# Patient Record
Sex: MALE | Race: OTHER | Employment: UNEMPLOYED | URBAN - METROPOLITAN AREA
[De-identification: names, ages, dates, MRNs, and addresses within clinical notes are randomized per-mention and may not be internally consistent; named-entity substitution may affect disease eponyms.]

---

## 2017-09-14 ENCOUNTER — OFFICE VISIT (OUTPATIENT)
Dept: INTERNAL MEDICINE | Age: 27
End: 2017-09-14

## 2017-09-14 DIAGNOSIS — J45.20 MILD INTERMITTENT ASTHMA WITHOUT COMPLICATION: ICD-10-CM

## 2017-09-14 DIAGNOSIS — F89 DEVELOPMENTAL DISABILITY: ICD-10-CM

## 2017-09-14 DIAGNOSIS — F95.2 TOURETTE'S DISEASE: ICD-10-CM

## 2017-09-14 DIAGNOSIS — F42.9 OBSESSIVE-COMPULSIVE DISORDER, UNSPECIFIED TYPE: Primary | ICD-10-CM

## 2017-09-14 PROCEDURE — 99213 OFFICE O/P EST LOW 20 MIN: CPT | Performed by: INTERNAL MEDICINE

## 2017-09-26 ASSESSMENT — ENCOUNTER SYMPTOMS
NAUSEA: 0
VOMITING: 0
ABDOMINAL PAIN: 0
BACK PAIN: 0
SHORTNESS OF BREATH: 0

## 2017-10-13 ENCOUNTER — OFFICE VISIT (OUTPATIENT)
Dept: PSYCHOLOGY | Age: 27
End: 2017-10-13

## 2017-10-13 DIAGNOSIS — F42.2 MIXED OBSESSIONAL THOUGHTS AND ACTS: Primary | ICD-10-CM

## 2017-10-13 PROCEDURE — 90791 PSYCH DIAGNOSTIC EVALUATION: CPT | Performed by: PSYCHOLOGIST

## 2017-10-13 ASSESSMENT — PATIENT HEALTH QUESTIONNAIRE - PHQ9
1. LITTLE INTEREST OR PLEASURE IN DOING THINGS: 0
2. FEELING DOWN, DEPRESSED OR HOPELESS: 0
5. POOR APPETITE OR OVEREATING: 1
7. TROUBLE CONCENTRATING ON THINGS, SUCH AS READING THE NEWSPAPER OR WATCHING TELEVISION: 1
SUM OF ALL RESPONSES TO PHQ QUESTIONS 1-9: 7
3. TROUBLE FALLING OR STAYING ASLEEP: 0
4. FEELING TIRED OR HAVING LITTLE ENERGY: 3
6. FEELING BAD ABOUT YOURSELF - OR THAT YOU ARE A FAILURE OR HAVE LET YOURSELF OR YOUR FAMILY DOWN: 1
8. MOVING OR SPEAKING SO SLOWLY THAT OTHER PEOPLE COULD HAVE NOTICED. OR THE OPPOSITE, BEING SO FIGETY OR RESTLESS THAT YOU HAVE BEEN MOVING AROUND A LOT MORE THAN USUAL: 1
SUM OF ALL RESPONSES TO PHQ9 QUESTIONS 1 & 2: 0
10. IF YOU CHECKED OFF ANY PROBLEMS, HOW DIFFICULT HAVE THESE PROBLEMS MADE IT FOR YOU TO DO YOUR WORK, TAKE CARE OF THINGS AT HOME, OR GET ALONG WITH OTHER PEOPLE: 1
9. THOUGHTS THAT YOU WOULD BE BETTER OFF DEAD, OR OF HURTING YOURSELF: 0

## 2017-10-13 NOTE — PROGRESS NOTES
Behavioral Health Consultation  [address] PsyD  Psychologist  10/13/2017  8:35 AM      Time spent with Patient: 30 minutes  [facility]. Reason for Consult:  Tourette's, OCD, panic disorder  Referring Provider: Cris Iniguez MD  [address], [address]    Pt provided informed consent for the behavioral health program. Discussed with patient model of service to include the limits of confidentiality (i.e. abuse reporting, suicide intervention, etc.) and short-term intervention focused approach. Pt indicated understanding. Feedback given to PCP. S:  Pt was seen with mother, Carmelo Washburn. Pt shared IEP for history. Was evlauated in  at [facility]. Was dx with Tourettes, OCD, and ADD. Also saw neurologist there. Didn't graduate HS until age 22 due to problems with focus/concentration. GPA was less than 1.5. Has not applied for SSDI. Has not had treatment for these conditions. Was on Wellbutrin for one week. No therapy for these either. Stopped going to [facility] in 2009. Currently struggles with more vocal tics. OCD - touches things twice. Rituals: left foot first when walking, go in and out same door. Intrusive thoughts - has to re-start thought if has intrusive thought to decouple it with other action. Has dreams to write screenplays or do some work in film. Schooling and finances are barriers to this.     O:  MSE:    Appearance    alert, cooperative  Sleep disturbance No  Fatigue Yes  Loss of pleasure No  Impulsive behavior No  Speech    halted  Mood    \"ok\"  Affect   flat  Thought Content    intact  Thought Process    linear and goal directed  Associations    logical connections  Insight    Poor to fair  Judgment    Intact  Orientation    oriented to person, place, time, and general circumstances  Memory    recent and remote memory intact  Attention/Concentration    intact  Ability to understand instructions Yes  Ability to respond meaningfully Yes  Suicide Assessment    Denies SI      History:    Medications:   Current Outpatient Prescriptions   Medication Sig Dispense Refill    meclizine (ANTIVERT) 12.5 MG tablet Take one tab q 6 hrs prn severe dizziness 30 tablet 1     No current facility-administered medications for this visit. Social History:   Social History     Social History    Marital status: Single     Spouse name: N/A    Number of children: N/A    Years of education: N/A     Occupational History    Not on file. Social History Main Topics    Smoking status: Never Smoker    Smokeless tobacco: Never Used    Alcohol use No    Drug use: No    Sexual activity: Not on file     Other Topics Concern    Not on file     Social History Narrative    Past Medical History                                                      Past Surgical History      wisdom teeth extraction - February 26, 2009 - 9:37 AM, canine tooth extracted - July 14, 2009                                                              Last updated by Janki Denny MA on 02/12/2010 9:54:01 AM             Social History     Marital Status: single,   Children: None,   Employment Status: student,   Alcohol Use: none    Drug Use: none    Tobacco Usage:non-smoker                                                        Last updated by Janki Denny MA on 02/12/2010 9:54:01 AM             Family History     mother - Brigitte Montero -     father - Ari Sheriff - schizophrenia (diagnosed age 12)        half-sister - Oliver Arriaga -    half-sister - Silvia Love -                                          Last updated by Janki Denny MA on 02/12/2010 9:54:01 AM                           TOBACCO:   reports that he has never smoked. He has never used smokeless tobacco.  ETOH:   reports that he does not drink alcohol. Family History:   No family history on file.       A:  Mr. Mino Gutierrez reports diagnoses of OCD, Tourettes, and ADD when in [facility] for which he received some services through [address]. He reports no behavioral therapy throughout his life. He has also not been medicated for these conditions. Mr. Lavern Bush is interested in treatment to improve quality of life. Due to lack of re-assessment since , it is recommended pt be thoroughly assessed through psychological testing to best determine appropriate treatment plan. He is agreeable.       Diagnosis:    Per history:  OCD  Tourette's  ADD      Plan:  Pt interventions:  Established rapport, agenda setting for visit, clinical interview, treatment planning    Pt Behavioral Change Plan:  Pt set goals to 1) call [facility] for psychological evaluation for diagnostic clarification 2) return for f/u after evaluation is completed

## 2017-12-13 ENCOUNTER — TELEPHONE (OUTPATIENT)
Dept: INTERNAL MEDICINE | Age: 27
End: 2017-12-13

## 2017-12-13 NOTE — TELEPHONE ENCOUNTER
I faxed the requested records mentioned below to [employer] [facility] today. I will scan the release, the notes that I faxed and the fax success into media and attach it to this phone note. I will close this phone note.

## 2017-12-13 NOTE — TELEPHONE ENCOUNTER
[employer] [facility] [place] [employer] division of disability determination sent request via fax Case # [id number]. Records requested from Dr. Lluvia Conner. Request dates from 6/2016- present. Request contains questionnaire as well. Handed to Medical records.

## 2017-12-18 ENCOUNTER — TELEPHONE (OUTPATIENT)
Dept: INTERNAL MEDICINE | Age: 27
End: 2017-12-18

## 2017-12-18 NOTE — TELEPHONE ENCOUNTER
We received a request for records from [place] [address] [facility] dated 12-. Case # [id number]. The request is for records from 9-2016 to present.

## 2019-01-03 ENCOUNTER — OFFICE VISIT (OUTPATIENT)
Dept: INTERNAL MEDICINE CLINIC | Age: 29
End: 2019-01-03
Payer: MEDICAID

## 2019-01-03 VITALS
HEIGHT: 66 IN | DIASTOLIC BLOOD PRESSURE: 64 MMHG | WEIGHT: 103 LBS | SYSTOLIC BLOOD PRESSURE: 122 MMHG | BODY MASS INDEX: 16.55 KG/M2

## 2019-01-03 DIAGNOSIS — F42.2 MIXED OBSESSIONAL THOUGHTS AND ACTS: ICD-10-CM

## 2019-01-03 DIAGNOSIS — E55.9 VITAMIN D DEFICIENCY: ICD-10-CM

## 2019-01-03 DIAGNOSIS — R42 DIZZINESS: Primary | ICD-10-CM

## 2019-01-03 DIAGNOSIS — R00.2 PALPITATIONS: ICD-10-CM

## 2019-01-03 DIAGNOSIS — R53.82 CHRONIC FATIGUE: ICD-10-CM

## 2019-01-03 DIAGNOSIS — Z11.4 ENCOUNTER FOR SCREENING FOR HIV: ICD-10-CM

## 2019-01-03 DIAGNOSIS — F89 DEVELOPMENTAL DISABILITY: ICD-10-CM

## 2019-01-03 DIAGNOSIS — R63.6 UNDERWEIGHT: ICD-10-CM

## 2019-01-03 PROCEDURE — G8484 FLU IMMUNIZE NO ADMIN: HCPCS | Performed by: INTERNAL MEDICINE

## 2019-01-03 PROCEDURE — G8419 CALC BMI OUT NRM PARAM NOF/U: HCPCS | Performed by: INTERNAL MEDICINE

## 2019-01-03 PROCEDURE — 99204 OFFICE O/P NEW MOD 45 MIN: CPT | Performed by: INTERNAL MEDICINE

## 2019-01-03 PROCEDURE — 1036F TOBACCO NON-USER: CPT | Performed by: INTERNAL MEDICINE

## 2019-01-03 PROCEDURE — G8427 DOCREV CUR MEDS BY ELIG CLIN: HCPCS | Performed by: INTERNAL MEDICINE

## 2019-01-03 ASSESSMENT — PATIENT HEALTH QUESTIONNAIRE - PHQ9
SUM OF ALL RESPONSES TO PHQ QUESTIONS 1-9: 0
1. LITTLE INTEREST OR PLEASURE IN DOING THINGS: 0
2. FEELING DOWN, DEPRESSED OR HOPELESS: 0
SUM OF ALL RESPONSES TO PHQ QUESTIONS 1-9: 0
SUM OF ALL RESPONSES TO PHQ9 QUESTIONS 1 & 2: 0

## 2019-01-03 ASSESSMENT — ENCOUNTER SYMPTOMS
STRIDOR: 0
SHORTNESS OF BREATH: 0
CHEST TIGHTNESS: 0
RESPIRATORY NEGATIVE: 1
CHOKING: 0
ABDOMINAL PAIN: 0
COUGH: 0

## 2019-03-12 ENCOUNTER — TELEPHONE (OUTPATIENT)
Dept: INTERNAL MEDICINE CLINIC | Age: 29
End: 2019-03-12

## 2019-03-14 ENCOUNTER — OFFICE VISIT (OUTPATIENT)
Dept: INTERNAL MEDICINE CLINIC | Age: 29
End: 2019-03-14
Payer: MEDICAID

## 2019-03-14 VITALS
BODY MASS INDEX: 16.88 KG/M2 | HEIGHT: 66 IN | SYSTOLIC BLOOD PRESSURE: 92 MMHG | DIASTOLIC BLOOD PRESSURE: 62 MMHG | WEIGHT: 105 LBS

## 2019-03-14 DIAGNOSIS — A64 STD (MALE): Primary | ICD-10-CM

## 2019-03-14 DIAGNOSIS — F41.9 ANXIETY: ICD-10-CM

## 2019-03-14 DIAGNOSIS — Z20.2 STD EXPOSURE: ICD-10-CM

## 2019-03-14 DIAGNOSIS — R63.6 UNDERWEIGHT: ICD-10-CM

## 2019-03-14 DIAGNOSIS — N39.0 URINARY TRACT INFECTION WITHOUT HEMATURIA, SITE UNSPECIFIED: ICD-10-CM

## 2019-03-14 DIAGNOSIS — F42.2 MIXED OBSESSIONAL THOUGHTS AND ACTS: ICD-10-CM

## 2019-03-14 DIAGNOSIS — N30.01 ACUTE CYSTITIS WITH HEMATURIA: ICD-10-CM

## 2019-03-14 LAB
BILIRUBIN, POC: 0
BLOOD URINE, POC: NORMAL
CLARITY, POC: NORMAL
COLOR, POC: NORMAL
GLUCOSE URINE, POC: 0
KETONES, POC: 0
LEUKOCYTE EST, POC: NORMAL
NITRITE, POC: 0
PH, POC: 7
PROTEIN, POC: NORMAL
SPECIFIC GRAVITY, POC: 1
UROBILINOGEN, POC: 0

## 2019-03-14 PROCEDURE — 81002 URINALYSIS NONAUTO W/O SCOPE: CPT | Performed by: INTERNAL MEDICINE

## 2019-03-14 PROCEDURE — G8427 DOCREV CUR MEDS BY ELIG CLIN: HCPCS | Performed by: INTERNAL MEDICINE

## 2019-03-14 PROCEDURE — G8484 FLU IMMUNIZE NO ADMIN: HCPCS | Performed by: INTERNAL MEDICINE

## 2019-03-14 PROCEDURE — 99214 OFFICE O/P EST MOD 30 MIN: CPT | Performed by: INTERNAL MEDICINE

## 2019-03-14 PROCEDURE — 1036F TOBACCO NON-USER: CPT | Performed by: INTERNAL MEDICINE

## 2019-03-14 PROCEDURE — G8419 CALC BMI OUT NRM PARAM NOF/U: HCPCS | Performed by: INTERNAL MEDICINE

## 2019-03-14 RX ORDER — CIPROFLOXACIN 250 MG/1
250 TABLET, FILM COATED ORAL 2 TIMES DAILY
Qty: 28 TABLET | Refills: 0 | Status: SHIPPED | OUTPATIENT
Start: 2019-03-14 | End: 2019-03-20 | Stop reason: SINTOL

## 2019-03-14 ASSESSMENT — PATIENT HEALTH QUESTIONNAIRE - PHQ9
1. LITTLE INTEREST OR PLEASURE IN DOING THINGS: 1
SUM OF ALL RESPONSES TO PHQ QUESTIONS 1-9: 2
2. FEELING DOWN, DEPRESSED OR HOPELESS: 1
SUM OF ALL RESPONSES TO PHQ9 QUESTIONS 1 & 2: 2
SUM OF ALL RESPONSES TO PHQ QUESTIONS 1-9: 2

## 2019-03-14 ASSESSMENT — ENCOUNTER SYMPTOMS
SHORTNESS OF BREATH: 0
RESPIRATORY NEGATIVE: 1
CHEST TIGHTNESS: 0
ABDOMINAL PAIN: 0

## 2019-03-15 ENCOUNTER — TELEPHONE (OUTPATIENT)
Dept: INTERNAL MEDICINE CLINIC | Age: 29
End: 2019-03-15

## 2019-03-15 LAB
C. TRACHOMATIS DNA ,URINE: NEGATIVE
N. GONORRHOEAE DNA, URINE: NEGATIVE

## 2019-03-17 LAB
ORGANISM: ABNORMAL
URINE CULTURE, ROUTINE: ABNORMAL
URINE CULTURE, ROUTINE: ABNORMAL

## 2019-03-20 ENCOUNTER — TELEPHONE (OUTPATIENT)
Dept: INTERNAL MEDICINE CLINIC | Age: 29
End: 2019-03-20

## 2019-03-20 RX ORDER — AMOXICILLIN 500 MG/1
1 CAPSULE ORAL 3 TIMES DAILY
Refills: 0 | COMMUNITY
Start: 2019-03-16 | End: 2019-05-02 | Stop reason: CLARIF

## 2019-03-22 ENCOUNTER — OFFICE VISIT (OUTPATIENT)
Dept: INTERNAL MEDICINE CLINIC | Age: 29
End: 2019-03-22
Payer: MEDICAID

## 2019-03-22 VITALS
HEIGHT: 66 IN | WEIGHT: 108 LBS | DIASTOLIC BLOOD PRESSURE: 64 MMHG | SYSTOLIC BLOOD PRESSURE: 102 MMHG | BODY MASS INDEX: 17.36 KG/M2

## 2019-03-22 DIAGNOSIS — N30.01 ACUTE CYSTITIS WITH HEMATURIA: ICD-10-CM

## 2019-03-22 DIAGNOSIS — R63.6 UNDERWEIGHT: ICD-10-CM

## 2019-03-22 DIAGNOSIS — F41.9 ANXIETY: ICD-10-CM

## 2019-03-22 DIAGNOSIS — F42.2 MIXED OBSESSIONAL THOUGHTS AND ACTS: ICD-10-CM

## 2019-03-22 LAB
BILIRUBIN URINE: NEGATIVE
BLOOD, URINE: ABNORMAL
CLARITY: CLEAR
COLOR: YELLOW
GLUCOSE URINE: NEGATIVE MG/DL
KETONES, URINE: NEGATIVE MG/DL
LEUKOCYTE ESTERASE, URINE: NEGATIVE
MICROSCOPIC EXAMINATION: YES
NITRITE, URINE: NEGATIVE
PH UA: 6 (ref 5–8)
PROTEIN UA: NEGATIVE MG/DL
SPECIFIC GRAVITY UA: 1.02 (ref 1–1.03)
URINE TYPE: ABNORMAL
UROBILINOGEN, URINE: 0.2 E.U./DL

## 2019-03-22 PROCEDURE — G8419 CALC BMI OUT NRM PARAM NOF/U: HCPCS | Performed by: INTERNAL MEDICINE

## 2019-03-22 PROCEDURE — 99214 OFFICE O/P EST MOD 30 MIN: CPT | Performed by: INTERNAL MEDICINE

## 2019-03-22 PROCEDURE — 1036F TOBACCO NON-USER: CPT | Performed by: INTERNAL MEDICINE

## 2019-03-22 PROCEDURE — 81003 URINALYSIS AUTO W/O SCOPE: CPT | Performed by: INTERNAL MEDICINE

## 2019-03-22 PROCEDURE — G8427 DOCREV CUR MEDS BY ELIG CLIN: HCPCS | Performed by: INTERNAL MEDICINE

## 2019-03-22 PROCEDURE — G8484 FLU IMMUNIZE NO ADMIN: HCPCS | Performed by: INTERNAL MEDICINE

## 2019-03-22 ASSESSMENT — ENCOUNTER SYMPTOMS
SHORTNESS OF BREATH: 0
NAUSEA: 0
VOMITING: 0
ABDOMINAL PAIN: 0

## 2019-03-23 LAB
EPITHELIAL CELLS, UA: 0 /HPF (ref 0–5)
HYALINE CASTS: 0 /LPF (ref 0–8)
RBC UA: 1 /HPF (ref 0–4)
WBC UA: 1 /HPF (ref 0–5)

## 2019-03-24 LAB — URINE CULTURE, ROUTINE: NORMAL

## 2019-03-25 ENCOUNTER — TELEPHONE (OUTPATIENT)
Dept: INTERNAL MEDICINE CLINIC | Age: 29
End: 2019-03-25

## 2019-03-25 DIAGNOSIS — Z00.00 ROUTINE GENERAL MEDICAL EXAMINATION AT A HEALTH CARE FACILITY: Primary | ICD-10-CM

## 2019-03-25 DIAGNOSIS — R53.82 CHRONIC FATIGUE: ICD-10-CM

## 2019-03-25 DIAGNOSIS — E55.9 VITAMIN D DEFICIENCY: ICD-10-CM

## 2019-03-25 DIAGNOSIS — Z00.00 ROUTINE GENERAL MEDICAL EXAMINATION AT A HEALTH CARE FACILITY: ICD-10-CM

## 2019-03-25 DIAGNOSIS — R42 DIZZINESS: ICD-10-CM

## 2019-03-25 DIAGNOSIS — R00.2 PALPITATIONS: ICD-10-CM

## 2019-03-25 DIAGNOSIS — Z11.4 ENCOUNTER FOR SCREENING FOR HIV: ICD-10-CM

## 2019-03-25 LAB
BASOPHILS ABSOLUTE: 0 K/UL (ref 0–0.2)
BASOPHILS RELATIVE PERCENT: 0.7 %
EOSINOPHILS ABSOLUTE: 0 K/UL (ref 0–0.6)
EOSINOPHILS RELATIVE PERCENT: 0.6 %
HCT VFR BLD CALC: 45.8 % (ref 40.5–52.5)
HEMOGLOBIN: 15.6 G/DL (ref 13.5–17.5)
LYMPHOCYTES ABSOLUTE: 1.3 K/UL (ref 1–5.1)
LYMPHOCYTES RELATIVE PERCENT: 26.9 %
MCH RBC QN AUTO: 31 PG (ref 26–34)
MCHC RBC AUTO-ENTMCNC: 34.1 G/DL (ref 31–36)
MCV RBC AUTO: 90.7 FL (ref 80–100)
MONOCYTES ABSOLUTE: 0.4 K/UL (ref 0–1.3)
MONOCYTES RELATIVE PERCENT: 8.4 %
NEUTROPHILS ABSOLUTE: 3 K/UL (ref 1.7–7.7)
NEUTROPHILS RELATIVE PERCENT: 63.4 %
PDW BLD-RTO: 13.5 % (ref 12.4–15.4)
PLATELET # BLD: 304 K/UL (ref 135–450)
PMV BLD AUTO: 8.6 FL (ref 5–10.5)
RBC # BLD: 5.05 M/UL (ref 4.2–5.9)
WBC # BLD: 4.8 K/UL (ref 4–11)

## 2019-03-26 LAB
A/G RATIO: 1.6 (ref 1.1–2.2)
ALBUMIN SERPL-MCNC: 4.5 G/DL (ref 3.4–5)
ALP BLD-CCNC: 83 U/L (ref 40–129)
ALT SERPL-CCNC: 12 U/L (ref 10–40)
ANION GAP SERPL CALCULATED.3IONS-SCNC: 14 MMOL/L (ref 3–16)
AST SERPL-CCNC: 14 U/L (ref 15–37)
BILIRUB SERPL-MCNC: 0.5 MG/DL (ref 0–1)
BUN BLDV-MCNC: 7 MG/DL (ref 7–20)
CALCIUM SERPL-MCNC: 10 MG/DL (ref 8.3–10.6)
CHLORIDE BLD-SCNC: 103 MMOL/L (ref 99–110)
CHOLESTEROL, TOTAL: 157 MG/DL (ref 0–199)
CO2: 26 MMOL/L (ref 21–32)
CREAT SERPL-MCNC: 0.7 MG/DL (ref 0.9–1.3)
FOLATE: 14.97 NG/ML (ref 4.78–24.2)
GFR AFRICAN AMERICAN: >60
GFR NON-AFRICAN AMERICAN: >60
GLOBULIN: 2.8 G/DL
GLUCOSE BLD-MCNC: 89 MG/DL (ref 70–99)
HDLC SERPL-MCNC: 49 MG/DL (ref 40–60)
HIV AG/AB: NORMAL
HIV ANTIGEN: NORMAL
HIV-1 ANTIBODY: NORMAL
HIV-2 AB: NORMAL
LDL CHOLESTEROL CALCULATED: 101 MG/DL
MAGNESIUM: 2.2 MG/DL (ref 1.8–2.4)
POTASSIUM SERPL-SCNC: 4.2 MMOL/L (ref 3.5–5.1)
SODIUM BLD-SCNC: 143 MMOL/L (ref 136–145)
T4 FREE: 1.4 NG/DL (ref 0.9–1.8)
TOTAL PROTEIN: 7.3 G/DL (ref 6.4–8.2)
TRIGL SERPL-MCNC: 36 MG/DL (ref 0–150)
TSH SERPL DL<=0.05 MIU/L-ACNC: 0.81 UIU/ML (ref 0.27–4.2)
VITAMIN B-12: 369 PG/ML (ref 211–911)
VITAMIN D 25-HYDROXY: <5 NG/ML
VLDLC SERPL CALC-MCNC: 7 MG/DL

## 2019-03-26 RX ORDER — ERGOCALCIFEROL 1.25 MG/1
50000 CAPSULE ORAL WEEKLY
Qty: 12 CAPSULE | Refills: 0 | Status: SHIPPED | OUTPATIENT
Start: 2019-03-26 | End: 2019-06-06 | Stop reason: SDUPTHER

## 2019-03-29 ENCOUNTER — TELEPHONE (OUTPATIENT)
Dept: INTERNAL MEDICINE CLINIC | Age: 29
End: 2019-03-29

## 2019-03-29 DIAGNOSIS — M25.50 ARTHRALGIA, UNSPECIFIED JOINT: Primary | ICD-10-CM

## 2019-04-02 ENCOUNTER — OFFICE VISIT (OUTPATIENT)
Dept: ORTHOPEDIC SURGERY | Age: 29
End: 2019-04-02
Payer: COMMERCIAL

## 2019-04-02 VITALS
SYSTOLIC BLOOD PRESSURE: 110 MMHG | BODY MASS INDEX: 17.36 KG/M2 | DIASTOLIC BLOOD PRESSURE: 72 MMHG | WEIGHT: 108 LBS | HEIGHT: 66 IN | HEART RATE: 94 BPM

## 2019-04-02 DIAGNOSIS — M25.561 ACUTE PAIN OF BOTH KNEES: Primary | ICD-10-CM

## 2019-04-02 DIAGNOSIS — M25.562 ACUTE PAIN OF BOTH KNEES: Primary | ICD-10-CM

## 2019-04-02 PROCEDURE — 99204 OFFICE O/P NEW MOD 45 MIN: CPT | Performed by: PHYSICIAN ASSISTANT

## 2019-04-02 NOTE — PROGRESS NOTES
Patient Name: Kisha Manning  : 1990  DOS: 2019        Chief Complaint:   Chief Complaint   Patient presents with    Knee Pain     Bilateral knee       History of Present Illness:  Kisha Manning is a 33 y.o. male who presents with a chief complaint of continued complaints of pain in the knee with irritation with walking, stairs and with overuse. Pain in the knee regularly with swelling and discomfort. Increase in pain over the past several weeks time. Patient notes his overall pain started on March 15 when he was given a prescription for ciprofloxacin for treatment of a UTI. Patient notes he took one dose of medication and 2-4 hours later developed significant tendon tightness and pain as well as joint pains and muscle cramps. Specifically isolated to bilateral Achilles tendons and bilateral knees. Notes that immediately stop taking the medication and got treated with a different antibiotic no residual effects from that medication. He notes since then he has had occasional muscle cramping and pain still residing in the anterior medial portion of the knees. States that a lot of the pain is noted when he goes up and down stairs as well as up and down hills. He notes that he has been using some CoQ 10 as well as magnesium to help with muscle aches and cramps and he notes he has noticed some mild improvement in his overall pains. He rates his current pain level VI out of 10 and states it is intermittent in nature. He notes he is very concerned that the medication may cause permanent muscle and joint damage. Medical History  Current Medications:   Prior to Admission medications    Medication Sig Start Date End Date Taking?  Authorizing Provider   Homeopathic Products (SPEEDGEL) GEL Formula #8E Baclo 2%, Diclo 3%, DMSO 5%, Ifeanyi 6%, Lido 2%, prilo 2% Pharm to comp 19  Yes KEI James   vitamin D (ERGOCALCIFEROL) 50080 units CAPS capsule Take 1 capsule by mouth once a week 4/5     Palpation: There is minimal tenderness along the medial joint line. Special Tests: Patellar Compression test is positive. Valgus & Varus test is negative. Skin: There are no rashes, ulcerations or lesions. Gait: Gait pattern is antalgic  Skin shows no rashes/ecchymosis to the affected area, no hyperesthesias, no discoloration, no temperature or color discrepancies. NEUROLOGICALLY: There is no evidence for sensory or motor deficits in the extremity. Coordination appears full with no spacticity or rigidity. Reflexes appear to be symmetric. Distal circulation intact. No signs of RSD. Additional Comments:  No deficiencies to quadriceps as well as moderate tightness noted to bilateral hamstrings. Concerns for development of patellofemoral syndrome and overall muscle decompensation may be contributing to some of the patient's pain. Procedure(s): No new procedure performed today    Diagnostic Test Findings:   Xray   no new x-ray preformed today    Assessment and Plan:       Diagnosis Orders   1.  Acute pain of both knees  Ambulatory referral to Physical Therapy              The orders below, if any, were placed during this visit:   Orders Placed This Encounter   Procedures    Ambulatory referral to Physical Therapy     Referral Priority:   Routine     Referral Type:   Eval and Treat     Requested Specialty:   Physical Therapy     Number of Visits Requested:   1              Treatment Plan:   -Acute onset of bilateral knee pain discussed with patient the prognosis of this is very positive and should resolve with minimal treatment.  -Advisor patient to begin physical therapy regimen for quadriceps strengthening as well as hamstrings stretching and strengthening as well to help avoid further patellar femoral disorders from developing in the future  -advised use of over-the-counter supplementation to help with anti-inflammatory purposes including tart cherry juice and tumeric   -also gave

## 2019-04-05 ENCOUNTER — OFFICE VISIT (OUTPATIENT)
Dept: INTERNAL MEDICINE CLINIC | Age: 29
End: 2019-04-05
Payer: COMMERCIAL

## 2019-04-05 VITALS
BODY MASS INDEX: 17.68 KG/M2 | HEIGHT: 66 IN | WEIGHT: 110 LBS | SYSTOLIC BLOOD PRESSURE: 110 MMHG | DIASTOLIC BLOOD PRESSURE: 68 MMHG

## 2019-04-05 DIAGNOSIS — E55.9 VITAMIN D DEFICIENCY: ICD-10-CM

## 2019-04-05 DIAGNOSIS — F41.9 ANXIETY: ICD-10-CM

## 2019-04-05 DIAGNOSIS — R63.6 UNDERWEIGHT: ICD-10-CM

## 2019-04-05 DIAGNOSIS — F42.2 MIXED OBSESSIONAL THOUGHTS AND ACTS: ICD-10-CM

## 2019-04-05 PROBLEM — N39.0 UTI (URINARY TRACT INFECTION): Status: RESOLVED | Noted: 2019-03-14 | Resolved: 2019-04-05

## 2019-04-05 PROCEDURE — G0444 DEPRESSION SCREEN ANNUAL: HCPCS | Performed by: INTERNAL MEDICINE

## 2019-04-05 PROCEDURE — 99214 OFFICE O/P EST MOD 30 MIN: CPT | Performed by: INTERNAL MEDICINE

## 2019-04-05 ASSESSMENT — PATIENT HEALTH QUESTIONNAIRE - PHQ9
SUM OF ALL RESPONSES TO PHQ9 QUESTIONS 1 & 2: 2
SUM OF ALL RESPONSES TO PHQ QUESTIONS 1-9: 2
SUM OF ALL RESPONSES TO PHQ QUESTIONS 1-9: 2
2. FEELING DOWN, DEPRESSED OR HOPELESS: 1
1. LITTLE INTEREST OR PLEASURE IN DOING THINGS: 1

## 2019-04-05 ASSESSMENT — ENCOUNTER SYMPTOMS
CHEST TIGHTNESS: 0
BACK PAIN: 0
VOMITING: 0
RESPIRATORY NEGATIVE: 1
SHORTNESS OF BREATH: 0
ABDOMINAL PAIN: 0
NAUSEA: 0

## 2019-04-05 NOTE — PROGRESS NOTES
Subjective:      Patient ID: Kisha Manning is a 33 y.o. male. Chief Complaint   Patient presents with    URI     follow up from 3/22/19??? No more UTI symptoms. No reaction to antibiotics. Says he has been \"taking supplements\" - magnesium, co enzyme q10, multivitamin. Also eating more fruits and vegetables. Has been doing exercise machine at home. Says he is waking up early - about 6am, but goes to sleep about 8pm. Stll struggling w OCD but not getting therapy and has been getting very anxious especially about health related issues. Appetite has been better recently. Having occ palpitations but somewhat improved as of late. Taking vit d as rx'd      Review of Systems   Constitutional: Negative. Negative for activity change, appetite change and fatigue. HENT: Negative. Respiratory: Negative. Negative for chest tightness and shortness of breath. Cardiovascular: Negative for chest pain and palpitations. Gastrointestinal: Negative for abdominal pain, nausea and vomiting. Genitourinary: Negative. Musculoskeletal: Negative for back pain. Skin: Negative. Neurological: Negative for weakness and numbness. Psychiatric/Behavioral: Negative for agitation, dysphoric mood and sleep disturbance. The patient is nervous/anxious. Objective:   Physical Exam   Constitutional: He is oriented to person, place, and time. He appears well-developed and well-nourished. HENT:   Head: Normocephalic and atraumatic. Mouth/Throat: No oropharyngeal exudate. Eyes: Pupils are equal, round, and reactive to light. Conjunctivae and EOM are normal.   Neck: Normal range of motion. Neck supple. No thyromegaly present. Cardiovascular: Normal rate, regular rhythm and normal heart sounds. No murmur heard. Pulmonary/Chest: Effort normal and breath sounds normal. No respiratory distress. He has no wheezes. Abdominal: He exhibits no distension. There is no tenderness.    Musculoskeletal: He exhibits no edema.   Lymphadenopathy:     He has no cervical adenopathy. Neurological: He is alert and oriented to person, place, and time. He has normal reflexes. No cranial nerve deficit. Skin: Skin is warm and dry. Psychiatric:   Odd affect-at baseline         Assessment and Plan:      OCD (obsessive compulsive disorder)  Needs to see Dr. Cristian Bryson make appt    Anxiety  Needs to discuss w psych-may need external referral based on Dr. Levon Eastman suggestions    Underweight  Cont to encourage small frequent meals w high caloric content    Vitamin D deficiency  Discussed regimen-pt relates understanding-will clayton in 3-4 mos       Encounter Diagnoses   Name Primary?  Mixed obsessional thoughts and acts     Anxiety     Underweight     Vitamin D deficiency        No orders of the defined types were placed in this encounter.

## 2019-04-12 ENCOUNTER — OFFICE VISIT (OUTPATIENT)
Dept: PSYCHOLOGY | Age: 29
End: 2019-04-12
Payer: COMMERCIAL

## 2019-04-12 DIAGNOSIS — F95.2 TOURETTE'S DISEASE: ICD-10-CM

## 2019-04-12 DIAGNOSIS — F89 DEVELOPMENTAL DISABILITY: Primary | ICD-10-CM

## 2019-04-12 PROCEDURE — 90791 PSYCH DIAGNOSTIC EVALUATION: CPT | Performed by: PSYCHOLOGIST

## 2019-04-12 ASSESSMENT — PATIENT HEALTH QUESTIONNAIRE - PHQ9
1. LITTLE INTEREST OR PLEASURE IN DOING THINGS: 0
SUM OF ALL RESPONSES TO PHQ QUESTIONS 1-9: 0
SUM OF ALL RESPONSES TO PHQ QUESTIONS 1-9: 0
2. FEELING DOWN, DEPRESSED OR HOPELESS: 0
SUM OF ALL RESPONSES TO PHQ9 QUESTIONS 1 & 2: 0

## 2019-04-12 ASSESSMENT — ANXIETY QUESTIONNAIRES
7. FEELING AFRAID AS IF SOMETHING AWFUL MIGHT HAPPEN: 0-NOT AT ALL SURE
1. FEELING NERVOUS, ANXIOUS, OR ON EDGE: 0-NOT AT ALL SURE
GAD7 TOTAL SCORE: 2
2. NOT BEING ABLE TO STOP OR CONTROL WORRYING: 0-NOT AT ALL SURE
4. TROUBLE RELAXING: 0-NOT AT ALL SURE
5. BEING SO RESTLESS THAT IT IS HARD TO SIT STILL: 1-SEVERAL DAYS
6. BECOMING EASILY ANNOYED OR IRRITABLE: 0-NOT AT ALL SURE
3. WORRYING TOO MUCH ABOUT DIFFERENT THINGS: 1-SEVERAL DAYS

## 2019-04-12 NOTE — PROGRESS NOTES
capsule 0    amoxicillin (AMOXIL) 500 MG capsule Take 1 capsule by mouth 3 times daily  0    meclizine (ANTIVERT) 12.5 MG tablet Take one tab q 6 hrs prn severe dizziness 30 tablet 1     No current facility-administered medications for this visit.         Social History:   Social History     Socioeconomic History    Marital status: Single     Spouse name: Not on file    Number of children: Not on file    Years of education: Not on file    Highest education level: Not on file   Occupational History    Not on file   Social Needs    Financial resource strain: Not on file    Food insecurity:     Worry: Not on file     Inability: Not on file    Transportation needs:     Medical: Not on file     Non-medical: Not on file   Tobacco Use    Smoking status: Never Smoker    Smokeless tobacco: Never Used   Substance and Sexual Activity    Alcohol use: No    Drug use: No    Sexual activity: Not on file   Lifestyle    Physical activity:     Days per week: Not on file     Minutes per session: Not on file    Stress: Not on file   Relationships    Social connections:     Talks on phone: Not on file     Gets together: Not on file     Attends Religion service: Not on file     Active member of club or organization: Not on file     Attends meetings of clubs or organizations: Not on file     Relationship status: Not on file    Intimate partner violence:     Fear of current or ex partner: Not on file     Emotionally abused: Not on file     Physically abused: Not on file     Forced sexual activity: Not on file   Other Topics Concern    Not on file   Social History Narrative    Past Medical History                                                      Past Surgical History      wisdom teeth extraction - February 26, 2009 - 9:37 AM, canine tooth extracted - July 14, 2009                                                              Last updated by Timothy Naik MA on 02/12/2010 9:54:01 AM             Social History     Marital

## 2019-05-02 ENCOUNTER — OFFICE VISIT (OUTPATIENT)
Dept: INTERNAL MEDICINE CLINIC | Age: 29
End: 2019-05-02
Payer: COMMERCIAL

## 2019-05-02 VITALS
SYSTOLIC BLOOD PRESSURE: 108 MMHG | HEIGHT: 66 IN | DIASTOLIC BLOOD PRESSURE: 66 MMHG | WEIGHT: 110 LBS | BODY MASS INDEX: 17.68 KG/M2

## 2019-05-02 DIAGNOSIS — F89 DEVELOPMENTAL DISABILITY: ICD-10-CM

## 2019-05-02 DIAGNOSIS — M25.562 CHRONIC PAIN OF BOTH KNEES: ICD-10-CM

## 2019-05-02 DIAGNOSIS — F41.9 ANXIETY: ICD-10-CM

## 2019-05-02 DIAGNOSIS — E55.9 VITAMIN D DEFICIENCY: ICD-10-CM

## 2019-05-02 DIAGNOSIS — R63.6 UNDERWEIGHT: ICD-10-CM

## 2019-05-02 DIAGNOSIS — M25.561 CHRONIC PAIN OF BOTH KNEES: ICD-10-CM

## 2019-05-02 DIAGNOSIS — G89.29 CHRONIC PAIN OF BOTH KNEES: ICD-10-CM

## 2019-05-02 DIAGNOSIS — F42.2 MIXED OBSESSIONAL THOUGHTS AND ACTS: ICD-10-CM

## 2019-05-02 PROBLEM — R00.2 PALPITATIONS: Status: RESOLVED | Noted: 2019-01-03 | Resolved: 2019-05-02

## 2019-05-02 PROCEDURE — 99395 PREV VISIT EST AGE 18-39: CPT | Performed by: INTERNAL MEDICINE

## 2019-05-02 ASSESSMENT — ENCOUNTER SYMPTOMS
ABDOMINAL PAIN: 0
SHORTNESS OF BREATH: 0
CHEST TIGHTNESS: 0
RESPIRATORY NEGATIVE: 1

## 2019-05-02 NOTE — ASSESSMENT & PLAN NOTE
See orders for patient and pt also given complete instructions re: course of therapy  Likely more muscular -needs to do more stretching

## 2019-05-02 NOTE — PROGRESS NOTES
Subjective:      Patient ID: Sujata Price is a 33 y.o. male. Chief Complaint   Patient presents with    Annual Exam     yearly/ tdap needed? Still having some muscle and joint pain-mostly in knees has not called yet to make appointment [facility] for his mental disability and lack of concentration. Taking his vit d regularly. Sujata Price  1990    Allergies   Allergen Reactions    Ciprofloxacin      Joint pain        Current Outpatient Medications   Medication Sig Dispense Refill    Homeopathic Products (SPEEDGEL) GEL Formula #8E Baclo 2%, Diclo 3%, DMSO 5%, Ifeanyi 6%, Lido 2%, prilo 2% Pharm to comp 2 Tube 11    vitamin D (ERGOCALCIFEROL) 04098 units CAPS capsule Take 1 capsule by mouth once a week 12 capsule 0    meclizine (ANTIVERT) 12.5 MG tablet Take one tab q 6 hrs prn severe dizziness 30 tablet 1     No current facility-administered medications for this visit. Vitals:    05/02/19 1344   BP: 108/66   Weight: 110 lb (49.9 kg)   Height: 5' 6\" (1.676 m)     Body mass index is 17.75 kg/m². Wt Readings from Last 3 Encounters:   05/02/19 110 lb (49.9 kg)   04/05/19 110 lb (49.9 kg)   04/02/19 108 lb (49 kg)     BP Readings from Last 3 Encounters:   05/02/19 108/66   04/05/19 110/68   04/02/19 110/72       Immunization History   Administered Date(s) Administered    Meningococcal MPSV4 (Menomune) 02/12/2010    Tdap (Boostrix, Adacel) 02/12/2010       Past Medical History:   Diagnosis Date    Asthma     Tourette's disease      Past Surgical History:   Procedure Laterality Date    WISDOM TOOTH EXTRACTION  2/26/2009     No family history on file.   Social History     Socioeconomic History    Marital status: Single     Spouse name: Not on file    Number of children: Not on file    Years of education: Not on file    Highest education level: Not on file   Occupational History    Not on file   Social Needs    Financial resource strain: Not on file    Food insecurity: Worry: Not on file     Inability: Not on file    Transportation needs:     Medical: Not on file     Non-medical: Not on file   Tobacco Use    Smoking status: Never Smoker    Smokeless tobacco: Never Used   Substance and Sexual Activity    Alcohol use: No    Drug use: No    Sexual activity: Not on file   Lifestyle    Physical activity:     Days per week: Not on file     Minutes per session: Not on file    Stress: Not on file   Relationships    Social connections:     Talks on phone: Not on file     Gets together: Not on file     Attends Moravian service: Not on file     Active member of club or organization: Not on file     Attends meetings of clubs or organizations: Not on file     Relationship status: Not on file    Intimate partner violence:     Fear of current or ex partner: Not on file     Emotionally abused: Not on file     Physically abused: Not on file     Forced sexual activity: Not on file   Other Topics Concern    Not on file   Social History Narrative    Past Medical History                                                      Past Surgical History      wisdom teeth extraction - February 26, 2009 - 9:37 AM, canine tooth extracted - July 14, 2009                                                              Last updated by Precious Mclean MA on 02/12/2010 9:54:01 AM             Social History     Marital Status: single,   Children: None,   Employment Status: student,   Alcohol Use: none    Drug Use: none    Tobacco Usage:non-smoker                                                        Last updated by Precious Mclean MA on 02/12/2010 9:54:01 AM             Family History     mother - Roberto Khan -     father - Steffi Loya - schizophrenia (diagnosed age 12)        half-sister - [place] -    half-sister - Syd Carrier -                                          Last updated by Precious Mclean MA on 02/12/2010 9:54:01 AM                                 Review of Systems   Constitutional: Negative for appetite change and fatigue. HENT: Negative. Respiratory: Negative. Negative for chest tightness and shortness of breath. Cardiovascular: Negative for chest pain and palpitations. Gastrointestinal: Negative for abdominal pain. Genitourinary: Negative. Musculoskeletal: Positive for arthralgias and myalgias. Skin: Negative. Neurological: Positive for light-headedness. Negative for weakness. Psychiatric/Behavioral: Positive for decreased concentration. Negative for dysphoric mood and sleep disturbance. The patient is nervous/anxious. Objective:   Physical Exam   Constitutional: He is oriented to person, place, and time. He appears well-developed and well-nourished. HENT:   Head: Atraumatic. Mouth/Throat: No oropharyngeal exudate. Eyes: Pupils are equal, round, and reactive to light. Conjunctivae and EOM are normal.   Neck: Normal range of motion. Neck supple. No thyromegaly present. Cardiovascular: Normal rate, regular rhythm and normal heart sounds. No murmur heard. Pulmonary/Chest: Effort normal and breath sounds normal. No respiratory distress. Abdominal: He exhibits no distension. There is no tenderness. Musculoskeletal: He exhibits no edema, tenderness or deformity. Lymphadenopathy:     He has no cervical adenopathy. Neurological: He is alert and oriented to person, place, and time. He has normal reflexes. No cranial nerve deficit. Skin: Skin is warm and dry. Psychiatric: He has a normal mood and affect.  His behavior is normal. Judgment and thought content normal.       Assessment and Plan:     Developmental disability  Encouraged again to see someone at [facility] for evaluation    Vitamin D deficiency  Reinforced regimen    OCD (obsessive compulsive disorder)  Referred for evaluation-denies worsening     Underweight  Encouraged small frequent high calorie snacks    Anxiety  See psych       disc'd routine health issues incl seat belt use/diet andexercise/sunscreen use/monthly testicular exam/need for regular eye exams/ q1-2 year routine lab exams/immunization update

## 2019-05-02 NOTE — PATIENT INSTRUCTIONS
that your heel is about 12 inches off the floor. Hold for about 6 seconds, then lower slowly. 4. Relax for up to 10 seconds between repetitions. 5. Repeat 8 to 12 times. 6. Switch legs and repeat steps 1 through 5, even if only one knee is sore. Active knee flexion    1. Lie on your stomach with your knees straight. If your kneecap is uncomfortable, roll up a washcloth and put it under your leg just above your kneecap. 2. Lift the foot of your affected leg by bending the knee so that you bring the foot up toward your buttock. If this motion hurts, try it without bending your knee quite as far. This may help you avoid any painful motion. 3. Slowly move your leg up and down. 4. Repeat 8 to 12 times. 5. Switch legs and repeat steps 1 through 4, even if only one knee is sore. Quadriceps stretch (facedown)    1. Lie flat on your stomach, and rest your face on the floor. 2. Wrap a towel or belt strap around the lower part of your affected leg. Then use the towel or belt strap to slowly pull your heel toward your buttock until you feel a stretch. 3. Hold for about 15 to 30 seconds, then relax your leg against the towel or belt strap. 4. Repeat 2 to 4 times. 5. Switch legs and repeat steps 1 through 4, even if only one knee is sore. Stationary exercise bike    1. If you do not have a stationary exercise bike at home, you can find one to ride at your local health club or community center. 2. Adjust the height of the bike seat so that your knee is slightly bent when your leg is extended downward. If your knee hurts when the pedal reaches the top, you can raise the seat so that your knee does not bend as much. 3. Start slowly. At first, try to do 5 to 10 minutes of cycling with little to no resistance. Then increase your time and the resistance bit by bit until you can do 20 to 30 minutes without pain.   4. If you start to have pain, rest your knee until your pain gets back to the level that is normal for you. Or cycle for less time or with less effort. Follow-up care is a key part of your treatment and safety. Be sure to make and go to all appointments, and call your doctor if you are having problems. It's also a good idea to know your test results and keep a list of the medicines you take. Where can you learn more? Go to https://[email or web address] org and sign in to your [employer] account. Enter C159 in the [facility] box to learn more about \"Knee Arthritis: Exercises. \"     If you do not have an account, please click on the \"Sign Up Now\" link. Current as of: September 20, 2018  Content Version: 11.9  © 6618-8554 [employer]. Care instructions adapted under license by [facility] ([facility]). If you have questions about a medical condition or this instruction, always ask your healthcare professional. [address] any warranty or liability for your use of this information.

## 2019-06-04 ENCOUNTER — OFFICE VISIT (OUTPATIENT)
Dept: ORTHOPEDIC SURGERY | Age: 29
End: 2019-06-04
Payer: COMMERCIAL

## 2019-06-04 VITALS
HEART RATE: 61 BPM | WEIGHT: 110.01 LBS | SYSTOLIC BLOOD PRESSURE: 98 MMHG | HEIGHT: 66 IN | BODY MASS INDEX: 17.68 KG/M2 | DIASTOLIC BLOOD PRESSURE: 61 MMHG

## 2019-06-04 DIAGNOSIS — M25.562 CHRONIC PAIN OF BOTH KNEES: Primary | ICD-10-CM

## 2019-06-04 DIAGNOSIS — G89.29 CHRONIC PAIN OF BOTH KNEES: Primary | ICD-10-CM

## 2019-06-04 DIAGNOSIS — M25.561 CHRONIC PAIN OF BOTH KNEES: Primary | ICD-10-CM

## 2019-06-04 PROCEDURE — 99214 OFFICE O/P EST MOD 30 MIN: CPT | Performed by: PHYSICIAN ASSISTANT

## 2019-06-04 NOTE — PROGRESS NOTES
Patient Name: Radha Francois  : 1990  DOS: 2019        Chief Complaint:   Chief Complaint   Patient presents with    Knee Pain     Bilateral knee     Currently: 19  patient is here for follow-up regarding bilateral knee pain and bilateral Achilles pain. Patient states that the pain in his Achilles tendons has settled down with the use of stretching and curcumin supplementation. Patient denies use of physical therapy however he states that he has increased his activity levels as well as swimming. Regards to the patient's knees he still notes occasional irritation in the anterior portion of bilateral knees with the right being worse than the left states the pain is occasional mainly with long periods of activity including stairs and walking is notes  alot of quadriceps/muscle fatigue. States he has noticed improvement with the curcumin an increase in overall activity as well. Denies any numbness burning tingling or radiating pains and leg also denies any buckling or give way of the knees. Previously: 19  History of Present Illness:  Radha Francois is a 33 y.o. male who presents with a chief complaint of continued complaints of pain in the knee with irritation with walking, stairs and with overuse. Pain in the knee regularly with swelling and discomfort. Increase in pain over the past several weeks time. Patient notes his overall pain started on March 15 when he was given a prescription for ciprofloxacin for treatment of a UTI. Patient notes he took one dose of medication and 2-4 hours later developed significant tendon tightness and pain as well as joint pains and muscle cramps. Specifically isolated to bilateral Achilles tendons and bilateral knees. Notes that immediately stop taking the medication and got treated with a different antibiotic no residual effects from that medication.   He notes since then he has had occasional muscle cramping and pain still residing in the anterior medial portion of the knees. States that a lot of the pain is noted when he goes up and down stairs as well as up and down hills. He notes that he has been using some CoQ 10 as well as magnesium to help with muscle aches and cramps and he notes he has noticed some mild improvement in his overall pains. He rates his current pain level VI out of 10 and states it is intermittent in nature. He notes he is very concerned that the medication may cause permanent muscle and joint damage. Medical History  Current Medications:   Prior to Admission medications    Medication Sig Start Date End Date Taking? Authorizing Provider   Homeopathic Products (SPEEDGEL) GEL Formula #8E Baclo 2%, Diclo 3%, DMSO 5%, Ifeanyi 6%, Lido 2%, prilo 2% Pharm to comp 4/2/19   KEI Gaitan   vitamin D (ERGOCALCIFEROL) 66944 units CAPS capsule Take 1 capsule by mouth once a week 3/26/19   Erlinda Rinaldi MD   meclizine (ANTIVERT) 12.5 MG tablet Take one tab q 6 hrs prn severe dizziness 11/12/15   Erlinda Rinaldi MD     Medical History:  has a past medical history of Asthma and Tourette's disease. Allergies: Allergies   Allergen Reactions    Ciprofloxacin      Joint pain       Review of Systems:               Review of Systems ______  Constitutional: Negative for fever and diaphoresis. ____________  Respiratory: Negative for shortness of breath.  ________  Gastrointestinal: Negative for abdominal pain. ________  Musculoskeletal: Positive for joint pain. ____  Skin: Negative for itching. ____  Neurological: Negative for loss of consciousness.  ______________  all other systems were reviewed and negative    Past Medical History:   Diagnosis Date    Asthma     Tourette's disease      Social History     Socioeconomic History    Marital status: Single     Spouse name: Not on file    Number of children: Not on file    Years of education: Not on file    Highest education level: Not on file   Occupational History    Not on file Social Needs    Financial resource strain: Not on file    Food insecurity:     Worry: Not on file     Inability: Not on file    Transportation needs:     Medical: Not on file     Non-medical: Not on file   Tobacco Use    Smoking status: Never Smoker    Smokeless tobacco: Never Used   Substance and Sexual Activity    Alcohol use: No    Drug use: No    Sexual activity: Not on file   Lifestyle    Physical activity:     Days per week: Not on file     Minutes per session: Not on file    Stress: Not on file   Relationships    Social connections:     Talks on phone: Not on file     Gets together: Not on file     Attends Restoration service: Not on file     Active member of club or organization: Not on file     Attends meetings of clubs or organizations: Not on file     Relationship status: Not on file    Intimate partner violence:     Fear of current or ex partner: Not on file     Emotionally abused: Not on file     Physically abused: Not on file     Forced sexual activity: Not on file   Other Topics Concern    Not on file   Social History Narrative    Past Medical History                                                      Past Surgical History      wisdom teeth extraction - February 26, 2009 - 9:37 AM, canine tooth extracted - July 14, 2009                                                              Last updated by Chrsitina Merrill MA on 02/12/2010 9:54:01 AM             Social History     Marital Status: single,   Children: None,   Employment Status: student,   Alcohol Use: none    Drug Use: none    Tobacco Usage:non-smoker                                                        Last updated by Christina Merrill MA on 02/12/2010 9:54:01 AM             Family History     mother - Arsenio Polite -     father - Stan Irvin - schizophrenia (diagnosed age 12)        half-sister - Rach Nidia -    half-sister - [place] -                                          Last updated by Christina Merrill MA on 02/12/2010 9:54:01 AM No family history on file. Physical Exam __  Constitutional: She is oriented to person, place, and time and well-developed, well-nourished, and in no distress. No distress. ____  HENT:   Head: Normocephalic and atraumatic. ____  Eyes: Conjunctivae are normal. ________  Cardiovascular: Intact distal pulses. ____  Pulmonary/Chest: Effort normal. ________________________  Neurological: She is alert and oriented to person, place, and time. ____  Skin: Skin is dry. She is not diaphoretic. ____  Psychiatric: Mood, affect and judgment normal. ______          Assessment   Vital Signs:      Vitals:    06/04/19 1432   BP: 98/61   Pulse: 61       bilateral Knee shows no evidence for DJD with no obvious pseudolaxity, no pain with weight bearing, normal gait and no palpable osteophytes. Inspection: No anterior swelling. No Swelling is present with  no effusion. The posterior aspect of the knee does not appears to be full with no tenderness. There is no erythema, rash, or ecchymosis. Range of Motion:  Right 0-130 left 0-130     No Pain with varus or valgus testing    There is no noted deformity     Strength:  Hamstrings rated: 4/5. Quadriceps rated: 4/5     Palpation: There is no tenderness along the medial joint line. Special Tests: Patellar Compression test is positive. Valgus & Varus test is negative. Skin: There are no rashes, ulcerations or lesions. Gait: Gait pattern is antalgic  Skin shows no rashes/ecchymosis to the affected area, no hyperesthesias, no discoloration, no temperature or color discrepancies. NEUROLOGICALLY: There is no evidence for sensory or motor deficits in the extremity. Coordination appears full with no spacticity or rigidity. Reflexes appear to be symmetric. Distal circulation intact. No signs of RSD. Additional Comments:  No deficiencies to quadriceps as well as moderate tightness noted to bilateral hamstrings.   Concerns for development of patellofemoral syndrome and overall muscle decompensation may be contributing to some of the patient's pain. Procedure(s): No new procedure performed today    Diagnostic Test Findings:   Xray   no new x-ray preformed today    Assessment and Plan:       Diagnosis Orders   1. Chronic pain of both knees  Ambulatory referral to Physical Therapy              The orders below, if any, were placed during this visit:   Orders Placed This Encounter   Procedures    Ambulatory referral to Physical Therapy     Referral Priority:   Routine     Referral Type:   Eval and Treat     Requested Specialty:   Physical Therapy     Number of Visits Requested:   1              Treatment Plan:   -Patient's bilateral knee pain discussed with patient the prognosis of this is very positive and should resolve with minimal treatment. -Gave patient prescription for physical therapy regimen for quadriceps strengthening as well as hamstrings stretching and strengthening as well to help avoid further patellar femoral disorders from developing in the future  -advised continued use of over-the-counter supplementation to help with anti-inflammatory purposes including tart cherry juice and tumeric   -advised patient to continue use of prescription for biomed speed gel for topical application 3-4 times daily as needed  -Advised patient to follow-up in as needed for reevaluation.          KEI Trevino

## 2019-06-06 RX ORDER — ERGOCALCIFEROL 1.25 MG/1
CAPSULE ORAL
Qty: 12 CAPSULE | Refills: 0 | Status: SHIPPED | OUTPATIENT
Start: 2019-06-06

## 2019-08-13 ENCOUNTER — TELEPHONE (OUTPATIENT)
Dept: INTERNAL MEDICINE CLINIC | Age: 29
End: 2019-08-13

## 2019-08-16 ENCOUNTER — HOSPITAL ENCOUNTER (OUTPATIENT)
Dept: GENERAL RADIOLOGY | Age: 29
Discharge: HOME | End: 2019-08-16
Payer: COMMERCIAL

## 2019-08-16 ENCOUNTER — HOSPITAL ENCOUNTER (OUTPATIENT)
Age: 29
Discharge: HOME | End: 2019-08-16
Payer: COMMERCIAL

## 2019-08-16 ENCOUNTER — OFFICE VISIT (OUTPATIENT)
Dept: INTERNAL MEDICINE CLINIC | Age: 29
End: 2019-08-16
Payer: COMMERCIAL

## 2019-08-16 VITALS
OXYGEN SATURATION: 97 % | HEART RATE: 77 BPM | HEIGHT: 66 IN | BODY MASS INDEX: 17.68 KG/M2 | DIASTOLIC BLOOD PRESSURE: 68 MMHG | WEIGHT: 110 LBS | SYSTOLIC BLOOD PRESSURE: 106 MMHG

## 2019-08-16 DIAGNOSIS — S93.402A SPRAIN OF LEFT ANKLE, UNSPECIFIED LIGAMENT, INITIAL ENCOUNTER: Primary | ICD-10-CM

## 2019-08-16 DIAGNOSIS — S93.402A SPRAIN OF LEFT ANKLE, UNSPECIFIED LIGAMENT, INITIAL ENCOUNTER: ICD-10-CM

## 2019-08-16 PROCEDURE — 99214 OFFICE O/P EST MOD 30 MIN: CPT | Performed by: NURSE PRACTITIONER

## 2019-08-16 PROCEDURE — 73610 X-RAY EXAM OF ANKLE: CPT

## 2019-08-16 ASSESSMENT — ENCOUNTER SYMPTOMS
CONSTIPATION: 0
ABDOMINAL PAIN: 0
COUGH: 0
SHORTNESS OF BREATH: 0
DIARRHEA: 0
COLOR CHANGE: 0
WHEEZING: 0
BACK PAIN: 0
SINUS PAIN: 0
SINUS PRESSURE: 0

## 2021-04-29 ENCOUNTER — E-VISIT (OUTPATIENT)
Dept: INTERNAL MEDICINE CLINIC | Age: 31
End: 2021-04-29
Payer: COMMERCIAL

## 2021-04-29 DIAGNOSIS — L81.9 HYPERPIGMENTED SKIN LESION: Primary | ICD-10-CM

## 2021-04-29 PROCEDURE — 99421 OL DIG E/M SVC 5-10 MIN: CPT | Performed by: INTERNAL MEDICINE

## 2021-04-30 NOTE — PROGRESS NOTES
5-10 minutes were spent on the digital evaluation and management of this patient.   Diagnoses and all orders for this visit:    Hyperpigmented skin lesion

## 2021-05-07 ENCOUNTER — OFFICE VISIT (OUTPATIENT)
Dept: INTERNAL MEDICINE CLINIC | Age: 31
End: 2021-05-07
Payer: COMMERCIAL

## 2021-05-07 VITALS — SYSTOLIC BLOOD PRESSURE: 110 MMHG | DIASTOLIC BLOOD PRESSURE: 64 MMHG | BODY MASS INDEX: 17.59 KG/M2 | WEIGHT: 109 LBS

## 2021-05-07 DIAGNOSIS — R63.6 UNDERWEIGHT: ICD-10-CM

## 2021-05-07 DIAGNOSIS — E55.9 VITAMIN D DEFICIENCY: ICD-10-CM

## 2021-05-07 DIAGNOSIS — R00.2 PALPITATION: ICD-10-CM

## 2021-05-07 DIAGNOSIS — F41.9 ANXIETY: ICD-10-CM

## 2021-05-07 DIAGNOSIS — Z00.00 WELL ADULT EXAM: ICD-10-CM

## 2021-05-07 DIAGNOSIS — L98.9 LEG SKIN LESION, LEFT: ICD-10-CM

## 2021-05-07 DIAGNOSIS — F42.2 MIXED OBSESSIONAL THOUGHTS AND ACTS: Primary | ICD-10-CM

## 2021-05-07 PROCEDURE — 99395 PREV VISIT EST AGE 18-39: CPT | Performed by: INTERNAL MEDICINE

## 2021-05-07 PROCEDURE — 93000 ELECTROCARDIOGRAM COMPLETE: CPT | Performed by: INTERNAL MEDICINE

## 2021-05-07 SDOH — ECONOMIC STABILITY: TRANSPORTATION INSECURITY
IN THE PAST 12 MONTHS, HAS THE LACK OF TRANSPORTATION KEPT YOU FROM MEDICAL APPOINTMENTS OR FROM GETTING MEDICATIONS?: NO

## 2021-05-07 SDOH — ECONOMIC STABILITY: INCOME INSECURITY: HOW HARD IS IT FOR YOU TO PAY FOR THE VERY BASICS LIKE FOOD, HOUSING, MEDICAL CARE, AND HEATING?: NOT HARD AT ALL

## 2021-05-07 ASSESSMENT — PATIENT HEALTH QUESTIONNAIRE - PHQ9
SUM OF ALL RESPONSES TO PHQ9 QUESTIONS 1 & 2: 0
SUM OF ALL RESPONSES TO PHQ QUESTIONS 1-9: 0
1. LITTLE INTEREST OR PLEASURE IN DOING THINGS: 0
SUM OF ALL RESPONSES TO PHQ QUESTIONS 1-9: 0

## 2021-05-07 ASSESSMENT — ENCOUNTER SYMPTOMS
COLOR CHANGE: 1
CHEST TIGHTNESS: 0
SHORTNESS OF BREATH: 0
ABDOMINAL PAIN: 0
RESPIRATORY NEGATIVE: 1

## 2021-05-07 NOTE — PROGRESS NOTES
Subjective:      Patient ID: Adarsh Presley is a 35 y.o. male. Chief Complaint   Patient presents with    Annual Exam     skin lesion on right leg      States  He feels well but continues to have underlying anxiety. Unwilling to share about OCD behaviors. States he has basically been in his home \"sheltering in\" for the past 14 mos. Has some lesions on lower leg for me to check. Has occastional palpitations but denies syncope or presyncope or chest pains. Taking vit d daily. Has not had his vaccine but trying to get appt asap. Adarsh Presley  1990    Allergies   Allergen Reactions    Ciprofloxacin      Joint pain        Current Outpatient Medications   Medication Sig Dispense Refill    vitamin D (ERGOCALCIFEROL) 12704 units CAPS capsule TAKE ONE CAPSULE BY MOUTH ONE TIME PER WEEK (Patient not taking: Reported on 8/16/2019) 12 capsule 0    Homeopathic Products (SPEEDGEL) GEL Formula #8E Baclo 2%, Diclo 3%, DMSO 5%, Ifeanyi 6%, Lido 2%, prilo 2% Pharm to comp (Patient not taking: Reported on 5/7/2021) 2 Tube 11    meclizine (ANTIVERT) 12.5 MG tablet Take one tab q 6 hrs prn severe dizziness (Patient not taking: Reported on 5/7/2021) 30 tablet 1     No current facility-administered medications for this visit. Vitals:    05/07/21 1510   BP: 110/64   Weight: 109 lb (49.4 kg)     Body mass index is 17.59 kg/m². Wt Readings from Last 3 Encounters:   05/07/21 109 lb (49.4 kg)   08/16/19 110 lb (49.9 kg)   06/04/19 110 lb 0.2 oz (49.9 kg)     BP Readings from Last 3 Encounters:   05/07/21 110/64   08/16/19 106/68   06/04/19 98/61       Immunization History   Administered Date(s) Administered    Meningococcal MPSV4 (Menomune) 02/12/2010    Tdap (Boostrix, Adacel) 02/12/2010       Past Medical History:   Diagnosis Date    Asthma     Tourette's disease      Past Surgical History:   Procedure Laterality Date    WISDOM TOOTH EXTRACTION  2/26/2009     History reviewed. No pertinent family history.   Social History     Socioeconomic History    Marital status: Single     Spouse name: Not on file    Number of children: Not on file    Years of education: Not on file    Highest education level: Not on file   Occupational History    Not on file   Social Needs    Financial resource strain: Not hard at all    Food insecurity     Worry: Never true     Inability: Never true    Transportation needs     Medical: No     Non-medical: No   Tobacco Use    Smoking status: Never Smoker    Smokeless tobacco: Never Used   Substance and Sexual Activity    Alcohol use: No    Drug use: No    Sexual activity: Not on file   Lifestyle    Physical activity     Days per week: Not on file     Minutes per session: Not on file    Stress: Not on file   Relationships    Social connections     Talks on phone: Not on file     Gets together: Not on file     Attends Pentecostalism service: Not on file     Active member of club or organization: Not on file     Attends meetings of clubs or organizations: Not on file     Relationship status: Not on file    Intimate partner violence     Fear of current or ex partner: Not on file     Emotionally abused: Not on file     Physically abused: Not on file     Forced sexual activity: Not on file   Other Topics Concern    Not on file   Social History Narrative    Past Medical History                                                      Past Surgical History      wisdom teeth extraction - February 26, 2009 - 9:37 AM, canine tooth extracted - July 14, 2009                                                              Last updated by Yoel Rodrigez MA on 02/12/2010 9:54:01 AM             Social History     Marital Status: single,   Children: None,   Employment Status: student,   Alcohol Use: none    Drug Use: none    Tobacco Usage:non-smoker                                                        Last updated by Yoel Rodrigez MA on 02/12/2010 9:54:01 AM             Family History     mother - Nissa Sheikh -     father -

## 2021-05-07 NOTE — ASSESSMENT & PLAN NOTE
Likely bruising from low boots he was wearing- call if not resolving in next month or 2 but appears benign

## 2022-01-24 ENCOUNTER — E-VISIT (OUTPATIENT)
Dept: PRIMARY CARE CLINIC | Age: 32
End: 2022-01-24
Payer: COMMERCIAL

## 2022-01-24 DIAGNOSIS — B35.6 TINEA CRURIS: Primary | ICD-10-CM

## 2022-01-24 PROCEDURE — 99422 OL DIG E/M SVC 11-20 MIN: CPT | Performed by: INTERNAL MEDICINE

## 2022-01-24 RX ORDER — TERBINAFINE HYDROCHLORIDE 250 MG/1
250 TABLET ORAL DAILY
Qty: 14 TABLET | Refills: 0 | Status: SHIPPED | OUTPATIENT
Start: 2022-01-24 | End: 2022-02-07

## 2022-01-24 NOTE — PROGRESS NOTES
I believe you have a fungal rash affecting your groin and scrotum. This is called jock itch or tinea cruris. Please use the terbinafine that has been sent to the pharmacy once a day for 2 weeks to treat the rash. Follow-up with your primary care physician if your symptoms continue. 11 to 20 minutes were spent on this E visit.

## 2022-02-01 ENCOUNTER — OFFICE VISIT (OUTPATIENT)
Dept: INTERNAL MEDICINE CLINIC | Age: 32
End: 2022-02-01
Payer: COMMERCIAL

## 2022-02-01 VITALS
SYSTOLIC BLOOD PRESSURE: 100 MMHG | DIASTOLIC BLOOD PRESSURE: 66 MMHG | HEIGHT: 66 IN | BODY MASS INDEX: 17.36 KG/M2 | TEMPERATURE: 98.1 F | WEIGHT: 108 LBS

## 2022-02-01 DIAGNOSIS — R63.6 UNDERWEIGHT: ICD-10-CM

## 2022-02-01 DIAGNOSIS — F41.9 ANXIETY: ICD-10-CM

## 2022-02-01 DIAGNOSIS — F89 DEVELOPMENTAL DISABILITY: ICD-10-CM

## 2022-02-01 DIAGNOSIS — L98.9 LEG SKIN LESION, RIGHT: ICD-10-CM

## 2022-02-01 PROCEDURE — G8419 CALC BMI OUT NRM PARAM NOF/U: HCPCS | Performed by: INTERNAL MEDICINE

## 2022-02-01 PROCEDURE — 99213 OFFICE O/P EST LOW 20 MIN: CPT | Performed by: INTERNAL MEDICINE

## 2022-02-01 PROCEDURE — 1036F TOBACCO NON-USER: CPT | Performed by: INTERNAL MEDICINE

## 2022-02-01 PROCEDURE — G8484 FLU IMMUNIZE NO ADMIN: HCPCS | Performed by: INTERNAL MEDICINE

## 2022-02-01 PROCEDURE — G8427 DOCREV CUR MEDS BY ELIG CLIN: HCPCS | Performed by: INTERNAL MEDICINE

## 2022-02-01 ASSESSMENT — ENCOUNTER SYMPTOMS
ABDOMINAL PAIN: 0
RESPIRATORY NEGATIVE: 1
SHORTNESS OF BREATH: 0
CHEST TIGHTNESS: 0

## 2022-02-01 ASSESSMENT — PATIENT HEALTH QUESTIONNAIRE - PHQ9
1. LITTLE INTEREST OR PLEASURE IN DOING THINGS: 1
SUM OF ALL RESPONSES TO PHQ QUESTIONS 1-9: 1
SUM OF ALL RESPONSES TO PHQ9 QUESTIONS 1 & 2: 1
SUM OF ALL RESPONSES TO PHQ QUESTIONS 1-9: 1
2. FEELING DOWN, DEPRESSED OR HOPELESS: 0
SUM OF ALL RESPONSES TO PHQ QUESTIONS 1-9: 1
SUM OF ALL RESPONSES TO PHQ QUESTIONS 1-9: 1

## 2022-02-01 NOTE — PROGRESS NOTES
Subjective:      Patient ID: Rubio Guerra is a 28 y.o. male. Chief Complaint   Patient presents with    Blister     ankle with sore (R)leg  recurring blisters x 6mos? ??   may have had a burn of right ankle from a heater- blistered- seems to have been present for more than 1 year in slightly different areas- was given lamisil for \"jock itch\" but not taking regularly- still homebound due to covid and his underlying psychiatric issues -still has odd affect and obvious anxiety       Review of Systems   Constitutional: Negative for appetite change and fatigue. HENT: Negative. Respiratory: Negative. Negative for chest tightness and shortness of breath. Cardiovascular: Negative for chest pain and palpitations. Gastrointestinal: Negative for abdominal pain. Genitourinary: Negative. Skin: Positive for rash. Neurological: Negative for weakness. Psychiatric/Behavioral: Negative for dysphoric mood. The patient is not nervous/anxious. History reviewed. No pertinent family history.   Social History     Socioeconomic History    Marital status: Single     Spouse name: Not on file    Number of children: Not on file    Years of education: Not on file    Highest education level: Not on file   Occupational History    Not on file   Tobacco Use    Smoking status: Never Smoker    Smokeless tobacco: Never Used   Substance and Sexual Activity    Alcohol use: No    Drug use: No    Sexual activity: Not on file   Other Topics Concern    Not on file   Social History Narrative    Past Medical History                                                      Past Surgical History      wisdom teeth extraction - February 26, 2009 - 9:37 AM, canine tooth extracted - July 14, 2009                                                              Last updated by Mahnaz Henson MA on 02/12/2010 9:54:01 AM             Social History     Marital Status: single,   Children: None,   Employment Status: student,   Alcohol Use: none    Drug Use: none    Tobacco Usage:non-smoker                                                        Last updated by Traci Reinoso MA on 02/12/2010 9:54:01 AM             Family History     mother - Rudolph Phelan -     father - Radha Ashraf - schizophrenia (diagnosed age 12)        half-sister - aRdha Gonzalez -    half-sister - Julio Gallo -                                          Last updated by Traci Reinoso MA on 02/12/2010 9:54:01 AM                         Social Determinants of Health     Financial Resource Strain: Low Risk     Difficulty of Paying Living Expenses: Not hard at all   Food Insecurity: No Food Insecurity    Worried About Running Out of Food in the Last Year: Never true    [place] of Food in the Last Year: Never true   Transportation Needs: No Transportation Needs    Lack of Transportation (Medical): No    Lack of Transportation (Non-Medical): No   Physical Activity:     Days of Exercise per Week: Not on file    Minutes of Exercise per Session: Not on file   Stress:     Feeling of Stress : Not on file   Social Connections:     Frequency of Communication with Friends and Family: Not on file    Frequency of Social Gatherings with Friends and Family: Not on file    Attends Advent Services: Not on file    Active Member of [address] [place] [address] [employer] or Organizations: Not on file    Attends Club or Organization Meetings: Not on file    Marital Status: Not on file   Intimate Partner Violence:     Fear of Current or Ex-Partner: Not on file    Emotionally Abused: Not on file    Physically Abused: Not on file    Sexually Abused: Not on file   Housing Stability:     Unable to Pay for Housing in the Last Year: Not on file    Number of [place] in the Last Year: Not on file    Unstable Housing in the Last Year: Not on file         Objective:   Physical Exam  Constitutional:       Appearance: He is well-developed. HENT:      Head: Normocephalic and atraumatic.    Eyes:      Conjunctiva/sclera: Conjunctivae normal. Pupils: Pupils are equal, round, and reactive to light. Cardiovascular:      Rate and Rhythm: Normal rate and regular rhythm. Pulmonary:      Effort: No respiratory distress. Breath sounds: Normal breath sounds. No wheezing. Musculoskeletal:      Cervical back: Normal range of motion and neck supple. Skin:     Comments: Blister right lateral ankle   Neurological:      Mental Status: He is alert and oriented to person, place, and time. Psychiatric:      Comments: Affect is unusual-pt very anxious throughout interview            Assessment and Plan:      Underweight   Weight fairly stable     Leg skin lesion, right   No obvious cellulitis-trial of steroid cream-see derm     Developmental disability   Still refuses psychiatric eval     Anxiety   Obviously still high- refuses therapy       Encounter Diagnoses   Name Primary?  Underweight     Leg skin lesion, right     Developmental disability     Anxiety        No orders of the defined types were placed in this encounter.

## 2022-02-01 NOTE — PATIENT INSTRUCTIONS
[facility]  [facility]  Obdulio Shaffer  [phone]    Dr. Blair Hewitt and associates  [facility]  [phone]  Brooks Aguilar and [facility] [phone]    Dr. Zion Luke.      Dr. Bossman Burgos. [phone]    Dr. Claudia Aguayo and all [facility] or Rfaiq Viera [phone]    Dr. Javid Osborn [address] [place] [address] Rd [phone]    Dr. Paulina Lance

## 2022-11-03 PROCEDURE — 93005 ELECTROCARDIOGRAM TRACING: CPT | Performed by: EMERGENCY MEDICINE

## 2022-11-03 PROCEDURE — 99284 EMERGENCY DEPT VISIT MOD MDM: CPT

## 2022-11-04 ENCOUNTER — HOSPITAL ENCOUNTER (EMERGENCY)
Age: 32
Discharge: HOME | End: 2022-11-04
Attending: EMERGENCY MEDICINE
Payer: COMMERCIAL

## 2022-11-04 VITALS
BODY MASS INDEX: 16.88 KG/M2 | DIASTOLIC BLOOD PRESSURE: 65 MMHG | RESPIRATION RATE: 16 BRPM | TEMPERATURE: 98.3 F | SYSTOLIC BLOOD PRESSURE: 124 MMHG | OXYGEN SATURATION: 100 % | HEART RATE: 104 BPM | WEIGHT: 105 LBS | HEIGHT: 66 IN

## 2022-11-04 DIAGNOSIS — R00.2 PALPITATIONS: Primary | ICD-10-CM

## 2022-11-04 LAB
ANION GAP SERPL CALCULATED.3IONS-SCNC: 11 MMOL/L (ref 3–16)
BASOPHILS ABSOLUTE: 0 K/UL (ref 0–0.2)
BASOPHILS RELATIVE PERCENT: 0.6 %
BUN BLDV-MCNC: 9 MG/DL (ref 7–20)
CALCIUM SERPL-MCNC: 9.9 MG/DL (ref 8.3–10.6)
CHLORIDE BLD-SCNC: 103 MMOL/L (ref 99–110)
CO2: 26 MMOL/L (ref 21–32)
CREAT SERPL-MCNC: 0.7 MG/DL (ref 0.9–1.3)
EKG ATRIAL RATE: 78 BPM
EKG DIAGNOSIS: NORMAL
EKG P AXIS: 74 DEGREES
EKG P-R INTERVAL: 136 MS
EKG Q-T INTERVAL: 364 MS
EKG QRS DURATION: 108 MS
EKG QTC CALCULATION (BAZETT): 414 MS
EKG R AXIS: 126 DEGREES
EKG T AXIS: 56 DEGREES
EKG VENTRICULAR RATE: 78 BPM
EOSINOPHILS ABSOLUTE: 0 K/UL (ref 0–0.6)
EOSINOPHILS RELATIVE PERCENT: 0.7 %
GFR SERPL CREATININE-BSD FRML MDRD: >60 ML/MIN/{1.73_M2}
GLUCOSE BLD-MCNC: 92 MG/DL (ref 70–99)
HCT VFR BLD CALC: 45.8 % (ref 40.5–52.5)
HEMOGLOBIN: 15.8 G/DL (ref 13.5–17.5)
LYMPHOCYTES ABSOLUTE: 1.6 K/UL (ref 1–5.1)
LYMPHOCYTES RELATIVE PERCENT: 23.2 %
MCH RBC QN AUTO: 31.3 PG (ref 26–34)
MCHC RBC AUTO-ENTMCNC: 34.5 G/DL (ref 31–36)
MCV RBC AUTO: 90.9 FL (ref 80–100)
MONOCYTES ABSOLUTE: 0.6 K/UL (ref 0–1.3)
MONOCYTES RELATIVE PERCENT: 8.8 %
NEUTROPHILS ABSOLUTE: 4.6 K/UL (ref 1.7–7.7)
NEUTROPHILS RELATIVE PERCENT: 66.7 %
PDW BLD-RTO: 13 % (ref 12.4–15.4)
PLATELET # BLD: 195 K/UL (ref 135–450)
PMV BLD AUTO: 9.2 FL (ref 5–10.5)
POTASSIUM REFLEX MAGNESIUM: 3.6 MMOL/L (ref 3.5–5.1)
RBC # BLD: 5.04 M/UL (ref 4.2–5.9)
SODIUM BLD-SCNC: 140 MMOL/L (ref 136–145)
TROPONIN: <0.01 NG/ML
WBC # BLD: 6.9 K/UL (ref 4–11)

## 2022-11-04 PROCEDURE — 80048 BASIC METABOLIC PNL TOTAL CA: CPT

## 2022-11-04 PROCEDURE — 85025 COMPLETE CBC W/AUTO DIFF WBC: CPT

## 2022-11-04 PROCEDURE — 36415 COLL VENOUS BLD VENIPUNCTURE: CPT

## 2022-11-04 PROCEDURE — 84484 ASSAY OF TROPONIN QUANT: CPT

## 2022-11-04 PROCEDURE — 93005 ELECTROCARDIOGRAM TRACING: CPT | Performed by: EMERGENCY MEDICINE

## 2022-11-04 ASSESSMENT — ENCOUNTER SYMPTOMS
BACK PAIN: 0
SHORTNESS OF BREATH: 0
COUGH: 0

## 2022-11-04 NOTE — ED PROVIDER NOTES
[address] [facility]          ATTENDING PHYSICIAN NOTE       Date of evaluation: 11/3/2022    Chief Complaint     Palpitations (Pt states having a palpitation sensation to chest that has been on and off for a few years, however the past few days it has progressed. )      History of Present Illness     Herson Soriano is a 33 y.o. male who presents with a complaint of palpitations. They have been on and off for the past several years but noted them over the last day. There is no associated chest pain or discomfort or shortness of breath. He has no recent fevers or chills or infectious symptoms. He states they feel like extra strong heartbeats, but he denies any lightheadedness, presyncope, or passing out. He denies any dizziness associated with these. There is no family history of cardiac dysrhythmia. Review of Systems     Review of Systems   Constitutional:  Negative for chills, fatigue and fever. Respiratory:  Negative for cough and shortness of breath. Cardiovascular:  Positive for palpitations. Negative for chest pain and leg swelling. Musculoskeletal:  Negative for back pain. Neurological:  Negative for weakness, light-headedness and numbness. All other systems reviewed and are negative. Past Medical, Surgical, Family, and Social History     He has a past medical history of ADHD, Asthma, and Tourette's disease. He has a past surgical history that includes [place] tooth extraction (2/26/2009). His family history is not on file. He reports that he has never smoked. He has never used smokeless tobacco. He reports that he does not drink alcohol and does not use drugs. Medications     Previous Medications    BETAMETHASONE VALERATE (VALISONE) 0.1 % OINTMENT    Apply topically 2 times daily.     HOMEOPATHIC PRODUCTS (SPEEDGEL) GEL    Formula #8E Baclo 2%, Diclo 3%, DMSO 5%, Ifeanyi 6%, Lido 2%, prilo 2% Pharm to comp    MECLIZINE (ANTIVERT) 12.5 MG TABLET    Take one tab q 6 hrs prn severe dizziness    VITAMIN D (ERGOCALCIFEROL) 06998 UNITS CAPS CAPSULE    TAKE ONE CAPSULE BY MOUTH ONE TIME PER WEEK       Allergies     He is allergic to ciprofloxacin. Physical Exam     INITIAL VITALS: BP: (!) 128/92, Temp: 98.3 °F (36.8 °C), Heart Rate: 93, Resp: 20, SpO2: 99 %   Physical Exam  Constitutional: Well nourished thin young man who appears in no acute distress. HEENT: NC/AT. PERRL 4-2 bilaterally. EOMI. CV:Heart is regular rate and rhythm without murmurs, rubs or gallops. No chest wall tenderness to palpation. No S3 or S4    Resp: Respirations unlabored. Lungs clear to auscultation w/o wheezing. Abd: Soft, nontender, nondistended. MSK: Full ROM, no edema or tenderness to palpation. Skin: Extremities are warm and well perfused. 2+ radial pulses . Cap Refill <3 seconds. Neuro: A&Ox3. Cranial nerves grossly intact   Strength and sensation intact x4 extremities. Psych: Thought content, behavior & judgement normal.      Diagnostic Results     EKG   EKG shows sinus rhythm with incomplete right bundle branch block in V1, T wave inversions in V1 through V3, in a young adult distribution. QRS is narrow, NE is within normal is and QTc is 414. There is no inferior ST segment depression or T wave inversion suggesting right heart strain no ST segment elevation seen.     RADIOLOGY:  No orders to display       LABS:   Results for orders placed or performed during the hospital encounter of 11/04/22   CBC with Auto Differential   Result Value Ref Range    WBC 6.9 4.0 - 11.0 K/uL    RBC 5.04 4.20 - 5.90 M/uL    Hemoglobin 15.8 13.5 - 17.5 g/dL    Hematocrit 45.8 40.5 - 52.5 %    MCV 90.9 80.0 - 100.0 fL    MCH 31.3 26.0 - 34.0 pg    MCHC 34.5 31.0 - 36.0 g/dL    RDW 13.0 12.4 - 15.4 %    Platelets 753 269 - 581 K/uL    MPV 9.2 5.0 - 10.5 fL    Neutrophils % 66.7 %    Lymphocytes % 23.2 %    Monocytes % 8.8 %    Eosinophils % 0.7 %    Basophils % 0.6 % Neutrophils Absolute 4.6 1.7 - 7.7 K/uL    Lymphocytes Absolute 1.6 1.0 - 5.1 K/uL    Monocytes Absolute 0.6 0.0 - 1.3 K/uL    Eosinophils Absolute 0.0 0.0 - 0.6 K/uL    Basophils Absolute 0.0 0.0 - 0.2 K/uL   Basic Metabolic Panel w/ Reflex to MG   Result Value Ref Range    Sodium 140 136 - 145 mmol/L    Potassium reflex Magnesium 3.6 3.5 - 5.1 mmol/L    Chloride 103 99 - 110 mmol/L    CO2 26 21 - 32 mmol/L    Anion Gap 11 3 - 16    Glucose 92 70 - 99 mg/dL    BUN 9 7 - 20 mg/dL    Creatinine 0.7 (L) 0.9 - 1.3 mg/dL    Est, Glom Filt Rate >60 >60    Calcium 9.9 8.3 - 10.6 mg/dL   Troponin   Result Value Ref Range    Troponin <0.01 <0.01 ng/mL       ED BEDSIDE ULTRASOUND:  No results found. RECENT VITALS:  BP: (!) 128/92,Temp: 98.3 °F (36.8 °C), Heart Rate: 93, Resp: 20, SpO2: 99 %     Procedures         ED Course     Nursing Notes, Past Medical Hx, Past Surgical Hx, Social Hx,Allergies, and Family Hx were reviewed. patient was given the following medications:  No orders of the defined types were placed in this encounter. CONSULTS:  None    MEDICAL DECISIONMAKING / ASSESSMENT / PLAN     Derrick Quezada is a 33 y.o. male presenting with an episode of palpitations. There is no sign of A. fib on the monitor on his EKG. His EKG shows a pediatric pattern of T wave progression, and right incomplete bundle branch block. There is no sign of acute cardiac ischemia on his EKG. His symptoms are not concerning for pulmonary embolus and he meets no PERC criteria for evaluation of PE. There is no chest pain associated with palpitations or signs spontaneous pneumothorax, with no productive cough fevers or chills, no chest x-ray was indicated. CBC demonstrated no symptomatic anemia. Renal panel showed no electrolyte disturbances and single screening troponin was in the normal limits.     Patient has a primary care physician and was directed to follow-up for further testing including possible Holter placement. .      Clinical Impression     1.  Palpitations        Disposition     PATIENT REFERRED TO:  Tom Petersenn APRN - CNP  [address]  [facility] [address] [facility]  [phone]    Schedule an appointment as soon as possible for a visit       [facility] Emergency Department  [address] [place] [address]  [phone]    If symptoms worsen    DISCHARGE MEDICATIONS:  New Prescriptions    No medications on file       DISPOSITION Decision To Discharge 11/04/2022 03:40:49 AM          Surjit Maloney MD  11/04/22 [id number]

## 2022-11-04 NOTE — DISCHARGE INSTRUCTIONS
Return to the ER for any episodes of lightheadedness, passing out, chest pain, difficulty breathing or other concerning symptoms.   Otherwise follow-up with your primary care provider for more testing

## 2022-11-05 LAB
EKG ATRIAL RATE: 71 BPM
EKG DIAGNOSIS: NORMAL
EKG P AXIS: 19 DEGREES
EKG P-R INTERVAL: 208 MS
EKG Q-T INTERVAL: 404 MS
EKG QRS DURATION: 124 MS
EKG QTC CALCULATION (BAZETT): 439 MS
EKG R AXIS: 161 DEGREES
EKG T AXIS: 14 DEGREES
EKG VENTRICULAR RATE: 71 BPM

## 2022-11-05 PROCEDURE — 93010 ELECTROCARDIOGRAM REPORT: CPT | Performed by: INTERNAL MEDICINE

## 2023-01-11 ENCOUNTER — OFFICE VISIT (OUTPATIENT)
Dept: INTERNAL MEDICINE CLINIC | Age: 33
End: 2023-01-11
Payer: COMMERCIAL

## 2023-01-11 VITALS
DIASTOLIC BLOOD PRESSURE: 70 MMHG | BODY MASS INDEX: 16.94 KG/M2 | SYSTOLIC BLOOD PRESSURE: 110 MMHG | TEMPERATURE: 97.5 F | HEART RATE: 73 BPM | OXYGEN SATURATION: 99 % | HEIGHT: 66 IN | WEIGHT: 105.4 LBS

## 2023-01-11 DIAGNOSIS — Z13.220 LIPID SCREENING: ICD-10-CM

## 2023-01-11 DIAGNOSIS — F95.2 TOURETTE'S DISEASE: ICD-10-CM

## 2023-01-11 DIAGNOSIS — R94.31 ABNORMAL EKG: ICD-10-CM

## 2023-01-11 DIAGNOSIS — F90.9 ATTENTION DEFICIT HYPERACTIVITY DISORDER (ADHD), UNSPECIFIED ADHD TYPE: ICD-10-CM

## 2023-01-11 DIAGNOSIS — R00.2 PALPITATION: Primary | ICD-10-CM

## 2023-01-11 DIAGNOSIS — R00.2 PALPITATION: ICD-10-CM

## 2023-01-11 DIAGNOSIS — F42.2 MIXED OBSESSIONAL THOUGHTS AND ACTS: ICD-10-CM

## 2023-01-11 DIAGNOSIS — E55.9 VITAMIN D DEFICIENCY: ICD-10-CM

## 2023-01-11 DIAGNOSIS — Z11.59 ENCOUNTER FOR HCV SCREENING TEST FOR LOW RISK PATIENT: ICD-10-CM

## 2023-01-11 DIAGNOSIS — F41.9 ANXIETY: ICD-10-CM

## 2023-01-11 PROCEDURE — G8484 FLU IMMUNIZE NO ADMIN: HCPCS | Performed by: INTERNAL MEDICINE

## 2023-01-11 PROCEDURE — 93000 ELECTROCARDIOGRAM COMPLETE: CPT | Performed by: INTERNAL MEDICINE

## 2023-01-11 PROCEDURE — 1036F TOBACCO NON-USER: CPT | Performed by: INTERNAL MEDICINE

## 2023-01-11 PROCEDURE — G8419 CALC BMI OUT NRM PARAM NOF/U: HCPCS | Performed by: INTERNAL MEDICINE

## 2023-01-11 PROCEDURE — G8427 DOCREV CUR MEDS BY ELIG CLIN: HCPCS | Performed by: INTERNAL MEDICINE

## 2023-01-11 PROCEDURE — 99214 OFFICE O/P EST MOD 30 MIN: CPT | Performed by: INTERNAL MEDICINE

## 2023-01-11 SDOH — ECONOMIC STABILITY: FOOD INSECURITY: WITHIN THE PAST 12 MONTHS, YOU WORRIED THAT YOUR FOOD WOULD RUN OUT BEFORE YOU GOT MONEY TO BUY MORE.: NEVER TRUE

## 2023-01-11 SDOH — ECONOMIC STABILITY: FOOD INSECURITY: WITHIN THE PAST 12 MONTHS, THE FOOD YOU BOUGHT JUST DIDN'T LAST AND YOU DIDN'T HAVE MONEY TO GET MORE.: NEVER TRUE

## 2023-01-11 ASSESSMENT — PATIENT HEALTH QUESTIONNAIRE - PHQ9
SUM OF ALL RESPONSES TO PHQ QUESTIONS 1-9: 0
SUM OF ALL RESPONSES TO PHQ QUESTIONS 1-9: 0
1. LITTLE INTEREST OR PLEASURE IN DOING THINGS: 0
SUM OF ALL RESPONSES TO PHQ QUESTIONS 1-9: 0
SUM OF ALL RESPONSES TO PHQ9 QUESTIONS 1 & 2: 0
SUM OF ALL RESPONSES TO PHQ QUESTIONS 1-9: 0
2. FEELING DOWN, DEPRESSED OR HOPELESS: 0

## 2023-01-11 ASSESSMENT — SOCIAL DETERMINANTS OF HEALTH (SDOH): HOW HARD IS IT FOR YOU TO PAY FOR THE VERY BASICS LIKE FOOD, HOUSING, MEDICAL CARE, AND HEATING?: NOT HARD AT ALL

## 2023-01-11 ASSESSMENT — ENCOUNTER SYMPTOMS: SHORTNESS OF BREATH: 0

## 2023-01-11 NOTE — PROGRESS NOTES
[facility] Internal Medicine  Establish care visit   2023    Jaime Dawson (:  1990) rod 33 y.o. male, here to establish care. Chief Complaint   Patient presents with    Establish Care     Former pt of Dr. Carlos Gonzalez, ongoing neck pain on right side \"popping\"         Patient Active Problem List   Diagnosis    Tourette's disease    OCD (obsessive compulsive disorder)    Developmental disability    Palpitation    Underweight    Anxiety    Vitamin D deficiency    Chronic pain of both knees    Sprain of left ankle    Leg skin lesion, right    ADHD       ASSESSMENT/ PLAN:    Anxiety  Fairly controlled  -not on meds, not doing therapy anymore (up to )    Tourette's disease  -symp of repetitive ticks, (eye squeezing, vocal ticks improved with age)  -saw psych in childhood, not since  -overall manageable     OCD (obsessive compulsive disorder)  -excessive hand washing , covid intensified symps  -done therapy, stopped after therapist switched to virtual during covid. -would like ot try therapy again, will refer to our new psychologist starting in March    ADHD  -diagnosed in childhood, not on treatment    Palpitation  -went to the ED with palpitations , EKG showed incomplete RBBB and T wave changes, repeated today, poor quality reading but again notes incomplete RBBB. Troponin was normal  -Check TSH  -CBC, BMP checked in the ED and were unremarkable  -Given abnormal EKG will send to Dr. Kandis Soni This Encounter   Procedures    Vitamin D 25 Hydroxy    LIPID PANEL    Hepatitis C Antibody    TSH with Reflex to Stevie Malhotra MD, Cardiology, [facility]    EKG 12 Lead     No orders of the defined types were placed in this encounter.      Medications Discontinued During This Encounter   Medication Reason    betamethasone valerate (VALISONE) 0.1 % ointment Therapy completed    Homeopathic Products (SPEEDGEL) GEL Therapy completed    meclizine (ANTIVERT) 12.5 MG tablet Therapy completed    vitamin D (ERGOCALCIFEROL) 80801 units CAPS capsule Therapy completed        No follow-ups on file. HPI  33years old man with history of Tourette's, ADHD, OCD, anxiety, establishing care. Prior patient of Dr. Charla Donohue retired. Reports occasional palpitations for which she went to the ED. No chest pain or discomfort, no dyspnea. No exertional symptoms. Does report some weight loss but no other hyperthyroid symptoms. OCD worsened since COVID with excessive hunger    HISTORIES  No current outpatient medications on file prior to visit. No current facility-administered medications on file prior to visit.         Allergies   Allergen Reactions    Ciprofloxacin      Joint pain     Past Medical History:   Diagnosis Date    ADHD     Asthma     Tourette's disease      Patient Active Problem List   Diagnosis    Tourette's disease    OCD (obsessive compulsive disorder)    Developmental disability    Palpitation    Underweight    Anxiety    Vitamin D deficiency    Chronic pain of both knees    Sprain of left ankle    Leg skin lesion, right    ADHD     Past Surgical History:   Procedure Laterality Date    WISDOM TOOTH EXTRACTION  2/26/2009     Social History     Socioeconomic History    Marital status: Single     Spouse name: Not on file    Number of children: Not on file    Years of education: Not on file    Highest education level: Not on file   Occupational History    Not on file   Tobacco Use    Smoking status: Never    Smokeless tobacco: Never   Vaping Use    Vaping Use: Never used   Substance and Sexual Activity    Alcohol use: No    Drug use: No    Sexual activity: Not Currently   Other Topics Concern    Not on file   Social History Narrative    Past Medical History                                                      Past Surgical History      wisdom teeth extraction - February 26, 2009 - 9:37 AM, canine tooth extracted - July 14, 2009 Last updated by May Buerger MA on 02/12/2010 9:54:01 AM             Social History     Marital Status: single,   Children: None,   Employment Status: student,   Alcohol Use: none    Drug Use: none    Tobacco Usage:non-smoker                                                        Last updated by May Buerger MA on 02/12/2010 9:54:01 AM             Family History     mother - Isaiah Childress -     father - Aleks Kearney - schizophrenia (diagnosed age 12)        half-sister - Belivan Prows -    half-sister - Vera Forde -                                          Last updated by May Buerger MA on 02/12/2010 9:54:01 AM                         Social Determinants of Health     Financial Resource Strain: Low Risk     Difficulty of Paying Living Expenses: Not hard at all   Food Insecurity: No Food Insecurity    Worried About Running Out of Food in the Last Year: Never true    Ran Out of Food in the Last Year: Never true   Transportation Needs: Not on file   Physical Activity: Not on file   Stress: Not on file   Social Connections: Not on file   Intimate Partner Violence: Not on file   Housing Stability: Not on file      Family History   Problem Relation Age of Onset    Lung Cancer Maternal Grandmother          ROS  Review of Systems   Constitutional:  Negative for fever and unexpected weight change. Respiratory:  Negative for shortness of breath. Cardiovascular:  Positive for palpitations. Negative for chest pain and leg swelling. PE  Vitals:    01/11/23 1356   BP: 110/70   Site: Left Upper Arm   Position: Sitting   Cuff Size: Child   Pulse: 73   Temp: 97.5 °F (36.4 °C)   TempSrc: Temporal   SpO2: 99%   Weight: 105 lb 6.4 oz (47.8 kg)   Height: 5' 6\" (1.676 m)     Estimated body mass index is 17.01 kg/m² as calculated from the following:    Height as of this encounter: 5' 6\" (1.676 m). Weight as of this encounter: 105 lb 6.4 oz (47.8 kg). Physical Exam  Vitals reviewed.    Constitutional:       General: He is not in acute distress. Appearance: Normal appearance. He is not ill-appearing, toxic-appearing or diaphoretic. HENT:      Head: Normocephalic and atraumatic. Eyes:      Conjunctiva/sclera: Conjunctivae normal.      Pupils: Pupils are equal, round, and reactive to light. Cardiovascular:      Rate and Rhythm: Normal rate and regular rhythm. Heart sounds: Normal heart sounds. Pulmonary:      Effort: Pulmonary effort is normal. No respiratory distress. Breath sounds: Normal breath sounds. Musculoskeletal:      Cervical back: Normal range of motion and neck supple. Right lower leg: No edema. Left lower leg: No edema. Skin:     General: Skin is warm and dry. Neurological:      Mental Status: He is alert and oriented to person, place, and time. Immunization History   Administered Date(s) Administered    Meningococcal MPSV4 (Menomune) 02/12/2010    Meningococcal, MCV4, Unspecifd Conjugate (A,C,Y and W-135) 02/12/2010    Td vaccine (adult) 02/12/2010    Tdap (Boostrix, Adacel) 02/12/2010       Health Maintenance   Topic Date Due    COVID-19 Vaccine (1) Never done    Hepatitis C screen  Never done    DTaP/Tdap/Td vaccine (2 - Td or Tdap) 02/12/2020    Depression Screen  02/01/2023    Flu vaccine (1) 01/11/2024 (Originally 8/1/2022)    HIV screen  Completed    Hepatitis A vaccine  Aged Out    Hib vaccine  Aged Out    Meningococcal (ACWY) vaccine  Aged Out    Pneumococcal 0-64 years Vaccine  Aged Out    Varicella vaccine  Discontinued       PSH, PMH, SH and FH reviewed and noted. Recent and past labs, tests and consults also reviewed. Recent or new meds also reviewed. Desean Osborn MD    This dictation was generated by voice recognition computer software. Although all attempts are made to edit the dictation for accuracy, there may be errors in the transcription that are not intended.

## 2023-01-11 NOTE — ASSESSMENT & PLAN NOTE
-went to the ED with palpitations 11/22, EKG showed incomplete RBBB and T wave changes, repeated today, poor quality reading but again notes incomplete RBBB.   Troponin was normal  -Check TSH  -CBC, BMP checked in the ED and were unremarkable  -Given abnormal EKG will send to Dr. Em Bernstein

## 2023-01-11 NOTE — ASSESSMENT & PLAN NOTE
-symp of repetitive ticks, (eye squeezing, vocal ticks improved with age)  -saw psych in childhood, not since  -overall manageable

## 2023-01-12 LAB
CHOLESTEROL, TOTAL: 161 MG/DL (ref 0–199)
HDLC SERPL-MCNC: 62 MG/DL (ref 40–60)
HEPATITIS C ANTIBODY INTERPRETATION: NORMAL
LDL CHOLESTEROL CALCULATED: 90 MG/DL
TRIGL SERPL-MCNC: 46 MG/DL (ref 0–150)
TSH REFLEX FT4: 0.46 UIU/ML (ref 0.27–4.2)
VITAMIN D 25-HYDROXY: 42.8 NG/ML
VLDLC SERPL CALC-MCNC: 9 MG/DL

## 2023-01-18 NOTE — PROGRESS NOTES
[facility]     Outpatient Cardiology         Patient Name:  Nimisha Perez  Requesting Physician: No admitting provider for patient encounter. Primary Care Physician: Tressa Arce MD    Reason for Consultation/Chief Complaint:   Chief Complaint   Patient presents with    Palpitations         History of Present Illness:    JUANITA Jo a 33 y.o. male with PMH of ADHD, anxiety, OCD, Tourette's disease. Here for abnormal EKG and palpitations. Referred by PCP. Abnormal EKG, has a right bundle branch block, present since 2021. Overall no symptoms concerning for previous PE or sleep apnea. Today we discussed getting an echocardiogram for further evaluation. Palpations, symptoms consistent with PACs/PVCs. Very sporadic. No particular trigger relieving factors. Not associated with any other symptoms. Overall there is no need to perform more work-up    PMH  Past Medical History:   Diagnosis Date    ADHD     Asthma     Tourette's disease        PSH  Past Surgical History:   Procedure Laterality Date    WISDOM TOOTH EXTRACTION  2/26/2009        Social HIstory  Social History     Tobacco Use    Smoking status: Never    Smokeless tobacco: Never   Vaping Use    Vaping Use: Never used   Substance Use Topics    Alcohol use: No    Drug use: No       Family History  Family History   Problem Relation Age of Onset    Lung Cancer Maternal Grandmother        Allergies   Allergies   Allergen Reactions    Ciprofloxacin      Joint pain       Medications:     Home Medications:  Were reviewed and are listed in nursing record. and/or listed below    Prior to Admission medications    Medication Sig Start Date End Date Taking?  Authorizing Provider   Ascorbic Acid (VITAMIN C) 250 MG tablet Take 250 mg by mouth daily   Yes Historical Provider, MD   Cholecalciferol (VITAMIN D3) 50 MCG (2000 UT) CAPS Take by mouth   Yes Historical Provider, MD   b complex vitamins capsule Take 1 capsule by mouth daily Yes Historical Provider, MD   Fiber Adult Gummies 2 g CHEW Take by mouth   Yes Historical Provider, MD        Review of Systems   Constitutional:  Negative for activity change, appetite change, diaphoresis, fatigue, fever and unexpected weight change. HENT:  Negative for congestion, facial swelling, mouth sores and nosebleeds. Eyes:  Negative for discharge and visual disturbance. Respiratory:  Negative for cough, chest tightness, shortness of breath and wheezing. Cardiovascular:  Positive for palpitations. Negative for chest pain and leg swelling. Gastrointestinal:  Negative for abdominal distention, abdominal pain, blood in stool and vomiting. Endocrine: Negative for cold intolerance, heat intolerance and polyuria. Genitourinary:  Negative for difficulty urinating, dysuria, frequency and hematuria. Musculoskeletal:  Negative for back pain, joint swelling, myalgias and neck pain. Skin:  Negative for color change, pallor and rash. Allergic/Immunologic: Negative for immunocompromised state. Neurological:  Negative for dizziness, syncope, weakness, light-headedness, numbness and headaches. Hematological:  Negative for adenopathy. Does not bruise/bleed easily. Psychiatric/Behavioral:  Negative for behavioral problems, confusion, decreased concentration and suicidal ideas. The patient is not nervous/anxious. Vitals:    01/19/23 1452   BP: 90/64   Pulse: 80    Weight: 104 lb 12.8 oz (47.5 kg)       Vitals:    01/19/23 1452   BP: 90/64   Site: Left Upper Arm   Position: Sitting   Cuff Size: Medium Adult   Pulse: 80   Weight: 104 lb 12.8 oz (47.5 kg)   Height: 5' 6\" (1.676 m)       BP Readings from Last 3 Encounters:   01/19/23 90/64   01/11/23 110/70   11/04/22 124/65       Wt Readings from Last 3 Encounters:   01/19/23 104 lb 12.8 oz (47.5 kg)   01/11/23 105 lb 6.4 oz (47.8 kg)   11/04/22 105 lb (47.6 kg)       Physical Exam  Constitutional:       General: He is not in acute distress. Appearance: He is well-developed. He is not diaphoretic. HENT:      Head: Normocephalic and atraumatic. Eyes:      Pupils: Pupils are equal, round, and reactive to light. Neck:      Thyroid: No thyromegaly. Vascular: No JVD. Cardiovascular:      Rate and Rhythm: Normal rate and regular rhythm. Chest Wall: PMI is not displaced. Heart sounds: Normal heart sounds, S1 normal and S2 normal. No murmur heard. No friction rub. No gallop. Pulmonary:      Effort: Pulmonary effort is normal. No respiratory distress. Breath sounds: Normal breath sounds. No stridor. No wheezing or rales. Chest:      Chest wall: No tenderness. Abdominal:      General: Bowel sounds are normal. There is no distension. Palpations: Abdomen is soft. Tenderness: There is no abdominal tenderness. There is no guarding or rebound. Musculoskeletal:         General: No tenderness. Normal range of motion. Cervical back: Normal range of motion. Lymphadenopathy:      Cervical: No cervical adenopathy. Skin:     General: Skin is warm and dry. Findings: No erythema or rash. Neurological:      Mental Status: He is alert and oriented to person, place, and time. Coordination: Coordination normal.   Psychiatric:         Behavior: Behavior normal.         Thought Content:  Thought content normal.         Judgment: Judgment normal.       Labs:       Lab Results   Component Value Date    WBC 6.9 11/04/2022    HGB 15.8 11/04/2022    HCT 45.8 11/04/2022    MCV 90.9 11/04/2022     11/04/2022     Lab Results   Component Value Date     11/04/2022    K 3.6 11/04/2022     11/04/2022    CO2 26 11/04/2022    BUN 9 11/04/2022    CREATININE 0.7 (L) 11/04/2022    GLUCOSE 92 11/04/2022    CALCIUM 9.9 11/04/2022    PROT 7.3 03/25/2019    LABALBU 4.5 03/25/2019    BILITOT 0.5 03/25/2019    ALKPHOS 83 03/25/2019    AST 14 (L) 03/25/2019    ALT 12 03/25/2019    LABGLOM >60 11/04/2022    GFRAA >60 03/25/2019    AGRATIO 1.6 03/25/2019    GLOB 2.8 03/25/2019         Lab Results   Component Value Date    CHOL 161 01/11/2023    CHOL 157 03/25/2019    CHOL 167 02/12/2016     Lab Results   Component Value Date    TRIG 46 01/11/2023    TRIG 36 03/25/2019    TRIG 40 02/12/2016     Lab Results   Component Value Date    HDL 62 (H) 01/11/2023    HDL 49 03/25/2019    HDL 63 (H) 02/12/2016     Lab Results   Component Value Date    LDLCALC 90 01/11/2023    LDLCALC 101 (H) 03/25/2019    LDLCALC 96 02/12/2016     Lab Results   Component Value Date    LABVLDL 9 01/11/2023    LABVLDL 7 03/25/2019    LABVLDL 8 02/12/2016     No results found for: CHOLHDLRATIO    No results found for: INR, PROTIME    The ASCVD Risk score (Meredith DK, et al., 2019) failed to calculate for the following reasons:    The 2019 ASCVD risk score is only valid for ages 40 to 79      Imaging:       Last ECG (if available, Personally interpreted):  Normal sinus rhythm, rate bundle-branch block    Last Monitor/Holter (if available):    Last Stress (if available):    Last Cath (if available):    Last TTE/JADYN(if available):    Last CMR  (if available):    Last Coronary Artery Calcium Score:     Ankle-brachial index:    Carotid ultrasound screening:    Abdominal aortic aneurysm screening:       Assessment / Plan:     RBBB  Plan for echocardiogram to assess RV size and function.  No risk factors worse previous symptoms concerning for PE or sleep apnea    Palpitations  Consistent with PACs or PVCs.  Very infrequent.    I had the opportunity to review the clinical symptoms and presentation of Estevan Baron.     Patient's allergies and medications were reviewed and updated. Patient's past medical, surgical, social and family history were reviewed and updated.   Patient's testing including laboratory, ECGs, monitor, imaging (TTE,JADYN,CMR,cath) were reviewed.       Tobacco use was discussed with the patient and educated on the negative effects.I have asked the  patient to not utilize these agents. All questions and concerns were addressed to the patient/family. Alternatives to my treatment were discussed. The note was completed using EMR. Every effort wasmade to ensure accuracy; however, inadvertent computerized transcription errors may be present. Thank you for allowing me to participate in thecrystal or Ainsley Bowman MD, [facility], [place] [address]

## 2023-01-19 ENCOUNTER — OFFICE VISIT (OUTPATIENT)
Dept: CARDIOLOGY CLINIC | Age: 33
End: 2023-01-19
Payer: COMMERCIAL

## 2023-01-19 VITALS
WEIGHT: 104.8 LBS | SYSTOLIC BLOOD PRESSURE: 90 MMHG | HEART RATE: 80 BPM | BODY MASS INDEX: 16.84 KG/M2 | DIASTOLIC BLOOD PRESSURE: 64 MMHG | HEIGHT: 66 IN

## 2023-01-19 DIAGNOSIS — R00.2 PALPITATIONS: ICD-10-CM

## 2023-01-19 DIAGNOSIS — I45.10 RBBB: ICD-10-CM

## 2023-01-19 DIAGNOSIS — R94.31 ABNORMAL EKG: Primary | ICD-10-CM

## 2023-01-19 PROCEDURE — G8427 DOCREV CUR MEDS BY ELIG CLIN: HCPCS | Performed by: INTERNAL MEDICINE

## 2023-01-19 PROCEDURE — 93000 ELECTROCARDIOGRAM COMPLETE: CPT | Performed by: INTERNAL MEDICINE

## 2023-01-19 PROCEDURE — G8419 CALC BMI OUT NRM PARAM NOF/U: HCPCS | Performed by: INTERNAL MEDICINE

## 2023-01-19 PROCEDURE — G8484 FLU IMMUNIZE NO ADMIN: HCPCS | Performed by: INTERNAL MEDICINE

## 2023-01-19 PROCEDURE — 1036F TOBACCO NON-USER: CPT | Performed by: INTERNAL MEDICINE

## 2023-01-19 PROCEDURE — 99204 OFFICE O/P NEW MOD 45 MIN: CPT | Performed by: INTERNAL MEDICINE

## 2023-01-19 RX ORDER — ACETAMINOPHEN 160 MG
TABLET,DISINTEGRATING ORAL
COMMUNITY

## 2023-01-19 RX ORDER — VITAMIN B COMPLEX
1 CAPSULE ORAL DAILY
COMMUNITY

## 2023-01-19 RX ORDER — OMEGA-3S/DHA/EPA/FISH OIL 1000-1400
CAPSULE,DELAYED RELEASE (ENTERIC COATED) ORAL
COMMUNITY

## 2023-01-19 RX ORDER — MULTIVIT WITH MINERALS/LUTEIN
250 TABLET ORAL DAILY
COMMUNITY

## 2023-01-19 ASSESSMENT — ENCOUNTER SYMPTOMS
BLOOD IN STOOL: 0
COLOR CHANGE: 0
FACIAL SWELLING: 0
BACK PAIN: 0
COUGH: 0
WHEEZING: 0
ABDOMINAL DISTENTION: 0
ABDOMINAL PAIN: 0
EYE DISCHARGE: 0
CHEST TIGHTNESS: 0
SHORTNESS OF BREATH: 0
VOMITING: 0

## 2023-01-19 NOTE — ASSESSMENT & PLAN NOTE
Plan for echocardiogram to assess RV size and function.   No risk factors worse previous symptoms concerning for PE or sleep apnea

## 2023-01-26 ENCOUNTER — TELEPHONE (OUTPATIENT)
Dept: INTERNAL MEDICINE CLINIC | Age: 33
End: 2023-01-26

## 2023-02-22 ENCOUNTER — OFFICE VISIT (OUTPATIENT)
Dept: INTERNAL MEDICINE CLINIC | Age: 33
End: 2023-02-22
Payer: COMMERCIAL

## 2023-02-22 VITALS
TEMPERATURE: 98.4 F | DIASTOLIC BLOOD PRESSURE: 70 MMHG | WEIGHT: 107 LBS | BODY MASS INDEX: 17.19 KG/M2 | HEART RATE: 94 BPM | OXYGEN SATURATION: 98 % | SYSTOLIC BLOOD PRESSURE: 110 MMHG | HEIGHT: 66 IN

## 2023-02-22 DIAGNOSIS — M54.2 NECK PAIN: ICD-10-CM

## 2023-02-22 PROBLEM — M25.562 CHRONIC PAIN OF BOTH KNEES: Status: RESOLVED | Noted: 2019-05-02 | Resolved: 2023-02-22

## 2023-02-22 PROBLEM — S93.402A SPRAIN OF LEFT ANKLE: Status: RESOLVED | Noted: 2019-08-16 | Resolved: 2023-02-22

## 2023-02-22 PROBLEM — M25.561 CHRONIC PAIN OF BOTH KNEES: Status: RESOLVED | Noted: 2019-05-02 | Resolved: 2023-02-22

## 2023-02-22 PROBLEM — G89.29 CHRONIC PAIN OF BOTH KNEES: Status: RESOLVED | Noted: 2019-05-02 | Resolved: 2023-02-22

## 2023-02-22 PROBLEM — L98.9 LEG SKIN LESION, RIGHT: Status: RESOLVED | Noted: 2021-05-07 | Resolved: 2023-02-22

## 2023-02-22 PROCEDURE — G8419 CALC BMI OUT NRM PARAM NOF/U: HCPCS | Performed by: INTERNAL MEDICINE

## 2023-02-22 PROCEDURE — 1036F TOBACCO NON-USER: CPT | Performed by: INTERNAL MEDICINE

## 2023-02-22 PROCEDURE — G8484 FLU IMMUNIZE NO ADMIN: HCPCS | Performed by: INTERNAL MEDICINE

## 2023-02-22 PROCEDURE — G8427 DOCREV CUR MEDS BY ELIG CLIN: HCPCS | Performed by: INTERNAL MEDICINE

## 2023-02-22 PROCEDURE — 99213 OFFICE O/P EST LOW 20 MIN: CPT | Performed by: INTERNAL MEDICINE

## 2023-02-22 RX ORDER — MELOXICAM 7.5 MG/1
7.5 TABLET ORAL DAILY
Qty: 7 TABLET | Refills: 0 | Status: SHIPPED | OUTPATIENT
Start: 2023-02-22 | End: 2023-03-01

## 2023-02-22 SDOH — ECONOMIC STABILITY: FOOD INSECURITY: WITHIN THE PAST 12 MONTHS, THE FOOD YOU BOUGHT JUST DIDN'T LAST AND YOU DIDN'T HAVE MONEY TO GET MORE.: NEVER TRUE

## 2023-02-22 SDOH — ECONOMIC STABILITY: INCOME INSECURITY: HOW HARD IS IT FOR YOU TO PAY FOR THE VERY BASICS LIKE FOOD, HOUSING, MEDICAL CARE, AND HEATING?: NOT HARD AT ALL

## 2023-02-22 SDOH — ECONOMIC STABILITY: FOOD INSECURITY: WITHIN THE PAST 12 MONTHS, YOU WORRIED THAT YOUR FOOD WOULD RUN OUT BEFORE YOU GOT MONEY TO BUY MORE.: NEVER TRUE

## 2023-02-22 SDOH — ECONOMIC STABILITY: HOUSING INSECURITY
IN THE LAST 12 MONTHS, WAS THERE A TIME WHEN YOU DID NOT HAVE A STEADY PLACE TO SLEEP OR SLEPT IN A SHELTER (INCLUDING NOW)?: NO

## 2023-02-22 ASSESSMENT — ENCOUNTER SYMPTOMS: BACK PAIN: 1

## 2023-02-22 NOTE — PROGRESS NOTES
[facility] Internal Medicine  Acute visit   2023    Shay Eagle (:  1990) is a 33 y.o. male, here for evaluation of the following medical concerns:    Chief Complaint   Patient presents with    Neck Pain     Off & on neck pain on right side ongoing     Abdominal Pain     X 1 week slight nausea         ASSESSMENT/ PLAN:  Neck pain  No red flags on hx and exam.   -trail of NSAID for 7 days  -I think PT will be beneficial and will send him.   -if no improvement or if worsening symp/ new neuro deficit, come back for eval, might need imaging, but at this point given no red flag I do not think it is indicated. Orders Placed This Encounter   Procedures    [facility] Physical Therapy [facility]      Orders Placed This Encounter   Medications    meloxicam (MOBIC) 7.5 MG tablet     Sig: Take 1 tablet by mouth daily for 7 days     Dispense:  7 tablet     Refill:  0      There are no discontinued medications. No follow-ups on file. HPI  Reports neck crepitus (\"Poop\") and pain when turning to the right  No shoulder/ arm pain weakness or numbness  No pain to palpation  Since     HISTORIES  Current Outpatient Medications on File Prior to Visit   Medication Sig Dispense Refill    Ascorbic Acid (VITAMIN C) 250 MG tablet Take 250 mg by mouth daily      Cholecalciferol (VITAMIN D3) 50 MCG ( UT) CAPS Take by mouth      b complex vitamins capsule Take 1 capsule by mouth daily      Fiber Adult Gummies 2 g CHEW Take by mouth       No current facility-administered medications on file prior to visit.       Allergies   Allergen Reactions    Ciprofloxacin      Joint pain     Past Medical History:   Diagnosis Date    ADHD     Asthma     Tourette's disease      Patient Active Problem List   Diagnosis    Tourette's disease    OCD (obsessive compulsive disorder)    Developmental disability    Palpitations    Underweight    Anxiety    Vitamin D deficiency    ADHD    RBBB    Neck pain     Past Surgical History: Procedure Laterality Date    WISDOM TOOTH EXTRACTION  2/26/2009     Social History     Socioeconomic History    Marital status: Single     Spouse name: Not on file    Number of children: Not on file    Years of education: Not on file    Highest education level: Not on file   Occupational History    Not on file   Tobacco Use    Smoking status: Never    Smokeless tobacco: Never   Vaping Use    Vaping Use: Never used   Substance and Sexual Activity    Alcohol use: No    Drug use: No    Sexual activity: Not Currently   Other Topics Concern    Not on file   Social History Narrative    Past Medical History                                                      Past Surgical History      wisdom teeth extraction - February 26, 2009 - 9:37 AM, canine tooth extracted - July 14, 2009                                                              Last updated by Jennifer Burroughs MA on 02/12/2010 9:54:01 AM             Social History     Marital Status: single,   Children: None,   Employment Status: student,   Alcohol Use: none    Drug Use: none    Tobacco Usage:non-smoker                                                        Last updated by Jennifer Burroughs MA on 02/12/2010 9:54:01 AM             Family History     mother - Charolette Babinski -     father - Lisa Cardenas - schizophrenia (diagnosed age 12)        half-sister - Kristine Sykes -    half-sister - Yaa Lyn -                                          Last updated by Jennifer Burroughs MA on 02/12/2010 9:54:01 AM                         Social Determinants of Health     Financial Resource Strain: Low Risk     Difficulty of Paying Living Expenses: Not hard at all   Food Insecurity: No Food Insecurity    Worried About [address] in the Last Year: Never true    [address] in the Last Year: Never true   Transportation Needs: Unknown    Lack of Transportation (Medical): Not on file    Lack of Transportation (Non-Medical):  No   Physical Activity: Not on file   Stress: Not on file   Social Connections: Not on file   Intimate Partner Violence: Not on file   Housing Stability: Unknown    Unable to Pay for Housing in the Last Year: Not on file    Number of [place] in the Last Year: Not on file    Unstable Housing in the Last Year: No      Family History   Problem Relation Age of Onset    Lung Cancer Maternal Grandmother        ROS  Review of Systems   Musculoskeletal:  Positive for back pain and neck pain. Negative for arthralgias, myalgias and neck stiffness. PE  Vitals:    02/22/23 1041   BP: 110/70   Site: Left Upper Arm   Position: Sitting   Cuff Size: Child   Pulse: 94   Temp: 98.4 °F (36.9 °C)   TempSrc: Temporal   SpO2: 98%   Weight: 107 lb (48.5 kg)   Height: 5' 6\" (1.676 m)     Estimated body mass index is 17.27 kg/m² as calculated from the following:    Height as of this encounter: 5' 6\" (1.676 m). Weight as of this encounter: 107 lb (48.5 kg). Physical Exam  Vitals reviewed. Constitutional:       General: He is not in acute distress. Appearance: Normal appearance. He is not ill-appearing, toxic-appearing or diaphoretic. HENT:      Head: Normocephalic and atraumatic. Eyes:      Conjunctiva/sclera: Conjunctivae normal.      Pupils: Pupils are equal, round, and reactive to light. Cardiovascular:      Rate and Rhythm: Normal rate and regular rhythm. Heart sounds: Normal heart sounds. Pulmonary:      Effort: Pulmonary effort is normal. No respiratory distress. Breath sounds: Normal breath sounds. Musculoskeletal:         General: No swelling, tenderness, deformity or signs of injury. Normal range of motion. Cervical back: Normal range of motion and neck supple. Right lower leg: No edema. Left lower leg: No edema. Skin:     General: Skin is warm and dry. Neurological:      Mental Status: He is alert and oriented to person, place, and time. Olga Lidia Ortega MD    This dictation was generated by voice recognition computer software.   Although all attempts are made to edit the dictation for accuracy, there may be errors in the transcription that are not intended.

## 2023-02-22 NOTE — ASSESSMENT & PLAN NOTE
No red flags on hx and exam.   -trail of NSAID for 7 days  -I think PT will be beneficial and will send him.   -if no improvement or if worsening symp/ new neuro deficit, come back for eval, might need imaging, but at this point given no red flag I do not think it is indicated.

## 2023-02-27 ENCOUNTER — HOSPITAL ENCOUNTER (OUTPATIENT)
Dept: NON INVASIVE DIAGNOSTICS | Age: 33
Discharge: HOME | End: 2023-02-27
Payer: COMMERCIAL

## 2023-02-27 DIAGNOSIS — R94.31 ABNORMAL EKG: ICD-10-CM

## 2023-02-27 LAB
LV EF: 58 %
LVEF MODALITY: NORMAL

## 2023-02-27 PROCEDURE — 93306 TTE W/DOPPLER COMPLETE: CPT

## 2023-02-28 ENCOUNTER — TELEPHONE (OUTPATIENT)
Dept: CARDIOLOGY CLINIC | Age: 33
End: 2023-02-28

## 2023-02-28 DIAGNOSIS — R00.2 PALPITATIONS: ICD-10-CM

## 2023-02-28 DIAGNOSIS — R93.1 ABNORMAL ECHOCARDIOGRAM: Primary | ICD-10-CM

## 2023-02-28 NOTE — TELEPHONE ENCOUNTER
Left detailed message with patient regarding echo cardiogram results. Per Dr Erendira Gregory, schedule a JADYN -  to further look at abnormal area found on echo. Likely artifact although we will need to get a JADYN to rule this out . Advised pt of JADYN and requested call back with any questions.

## 2023-03-07 ENCOUNTER — HOSPITAL ENCOUNTER (OUTPATIENT)
Dept: PHYSICAL THERAPY | Age: 33
Setting detail: THERAPIES SERIES
Discharge: HOME | End: 2023-03-07
Payer: COMMERCIAL

## 2023-03-07 PROCEDURE — 97161 PT EVAL LOW COMPLEX 20 MIN: CPT

## 2023-03-07 PROCEDURE — 97110 THERAPEUTIC EXERCISES: CPT

## 2023-03-07 NOTE — FLOWSHEET NOTE
[facility]  [address] [place] [address], [address] [place] [address]  Phone: [phone]   Fax: [phone]    Physical Therapy Treatment Note/ Progress Report:     Date:  2023     Patient Name:  Makeda Bedoya    :  1990  MRN: [id number]  Medical Diagnosis:  Neck pain [M54.2]  Treatment Diagnosis:  Treatment Diagnosis: neck pain B78.5  Insurance/Certification information:  PT Insurance Information: [employer], 30 visits  Physician Information:  Chely Vogt MD   Plan of care signed:    [] Yes  [x] No    Date of Patient follow up with Physician: ?     Progress Report: []  Yes  [x]  No   If Yes, Date Range for reporting period:  Beginnin2023  Ending:  NA    Progress report due (10 Rx/or 30 days whichever is less):      Recertification due (POC duration/ or 90 days whichever is less):      Visit # Insurance Allowable Auth Needed   30 []Yes   [x]No     RESTRICTIONS/PRECAUTIONS: asthma, ADHD, Tourette's disease, diagnosed with vertigo disorder BPPV , heart palpitations in 2022 and F/U with cardiologist, anxiety, shortness of breath  Latex Allergy:  [x]NO      []YES  Preferred Language for Healthcare:   [x]English       []other:    Functional Scale: NDI   Score: 7/45  Date assessed: 2023    Pain level:  0/10     SUBJECTIVE:  See eval    OBJECTIVE: See eval  Observation:   Test measurements:      Exercises/Interventions: Exercises in bold performed in department today. Items not bolded are carried forward from prior visits for continuity of the record. Exercise/Equipment Resistance/Repetitions Other comments   Chin tuck X 5 - 10    Scapular retraction  X 5 - 10    UT/levator stretches     Pec stretch     T-band rows/ext                                                                        Home Exercise Program:Pt. demonstrated good understanding and knowledge of HEP. Written instructions provided.     2023: chin tuck, scapular retraction. Access Code [facility]    Therapeutic Exercise:   [x] (50017) Provided verbal/tactile cueing for activities related to strengthening, flexibility, endurance, ROM  for improvements in scapular, scapulothoracic and UE control with self care, reaching, carrying, lifting, house/yardwork, driving/computer work. NMR:   [] (17680) Provided verbal/tactile cueing for activities related to improving balance, coordination, kinesthetic sense, posture, motor skill, proprioception  to assist with  scapular, scapulothoracic and UE control with self care, reaching, carrying, lifting, house/yardwork, driving/computer work. Therapeutic Activities:    [] (70107) Provided verbal/tactile cueing for activities related to improving balance, coordination, kinesthetic sense, posture, motor skill, proprioception and motor activation to allow for proper function of scapular, scapulothoracic and UE control with self care, carrying, lifting, driving/computer work. Home Exercise Program:    [x] (27089) Reviewed/Progressed HEP activities related to strengthening, flexibility, endurance, ROM of scapular, scapulothoracic and UE control with self care, reaching, carrying, lifting, house/yardwork, driving/computer work.   [] (77528) Reviewed/Progressed HEP activities related to improving balance, coordination, kinesthetic sense, posture, motor skill, proprioception of scapular, scapulothoracic and UE control with self care, reaching, carrying, lifting, house/yardwork, driving/computer work.      Manual Treatments:  PROM / STM / Oscillations-Mobs:  G-I, II, III, IV (PA's, Inf., Post.)  [] (75421) Provided manual therapy to mobilize soft tissue/joints of cervical/CT, scapular GHJ and UE for the purpose of modulating pain, promoting relaxation,  increasing ROM, reducing/eliminating soft tissue swelling/inflammation/restriction, improving soft tissue extensibility and allowing for proper ROM for normal function with self care, reaching, carrying, lifting, house/yardwork, driving/computer work. Modalities:    [] Electric Stimulation:   [] Ultrasound:   [] Other:       Charges:  Timed Code Treatment Minutes: TE: 10   Total Treatment Minutes: 30      [x] EVAL (LOW) 64215 (typically 20 minutes face-to-face)  [] EVAL (MOD) 50531 (typically 30 minutes face-to-face)  [] EVAL (HIGH) 19140 (typically 45 minutes face-to-face)  [] RE-EVAL     [x] YE(66942) x 1      [] NMR (26404) x       [] Manual (50225) x       [] TA (30181) x         [] ES(attended) (04305)   [] ES (un) (07114)   [] DRY NEEDLE 1 OR 2 MUSCLES  [] DRY NEEDLE 3+ MUSCLES  [] Mech Traction (70506)  [] Ultrasound (52996)  [] Other:    GOALS:  Patient stated goal: \"widen motion range\"  [] Progressing: [] Met: [] Not Met: [] Adjusted    Therapist goals for Patient:   Short Term Goals: To be achieved in: 3 weeks  - Independent in initial HEP per patient tolerance, in order to prevent re-injury. [] Progressing: [] Met: [] Not Met: [] Adjusted  - Patient will demonstrate good postural awareness consistently t/o session. [] Progressing: [] Met: [] Not Met: [] Adjusted    Long Term Goals: To be achieved in: 6 weeks  - Disability index score of 4% or less for the NDI to assist with reaching prior level of function. 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 [] Progressing: [] Met: [] Not Met: [] Adjusted  - Independent in HEP progression per patient tolerance, in order to prevent re-injury.    [] Progressing: [] Met: [] Not Met: [] Adjusted     ASSESSMENT:  See eval      Treatment/Activity Tolerance:  [x] Patient tolerated treatment well [] Patient limited by fatique  [] Patient limited by pain  [] Patient limited by other medical complications  [] Other:     Overall Progression Towards Functional goals/ Treatment Progress Update:  [] Patient is progressing as expected towards functional goals listed. [] Progression is slowed due to complexities/Impairments listed. [] Progression has been slowed due to co-morbidities. [x] Plan just implemented, too soon to assess goals progression <30days   [] Goals require adjustment due to lack of progress  [] Patient is not progressing as expected and requires additional follow up with physician  [] Other    Prognosis for POC: [x] Good [] Fair  [] Poor    Patient requires continued skilled intervention: [x] Yes  [] No        PLAN: See eval  [] Continue per plan of care [] Alter current plan (see comments)  [x] Plan of care initiated [] Hold pending MD visit [] Discharge    Electronically signed by: Elaine Biggs, PT, DPT [id number]    Note: If patient does not return for scheduled/recommended follow up visits, this note will serve as a discharge from care along with the most recent update on progress.

## 2023-03-07 NOTE — PLAN OF CARE
[facility] Outpatient Therapy  [address] [place] [address], [address], [address]  Phone: [phone]   Fax: [phone]                                                       Physical Therapy Certification    Dear Renae Izquierdo MD ,    We had the pleasure of evaluating the following patient for physical therapy services at [facility] [place] ([facility]). A summary of our findings can be found in the initial assessment below. This includes our plan of care. If you have any questions or concerns regarding these findings, please do not hesitate to contact me at the office phone number checked above. Thank you for the referral.       Physician Signature:_______________________________Date:__________________  By signing above (or electronic signature), therapist's plan is approved by physician      Patient: Lee Ramsay   : 1990   MRN: [id number]  Referring Physician:  Renae Izquierdo MD      Evaluation Date: 3/7/2023      Medical Diagnosis Information:  Diagnosis: Neck pain (M54.2)   Treatment Diagnosis: neck pain M54.2                                         Insurance information: PT Insurance Information: [employer], 30 visits     Precautions/ Contra-indications: asthma, ADHD, Tourette's disease, diagnosed with vertigo disorder BPPV , heart palpitations in 2022 and F/U with cardiologist, anxiety, shortness of breath  Latex Allergy:  [x]NO      []YES  Preferred Language for Healthcare:   [x]English       []other:  C-SSRS Triggered by Intake questionnaire (Past 2 wk assessment):   [x] No, Questionnaire did not trigger screening.   [] Yes, Patient intake triggered further evaluation      [] C-SSRS Screening completed  [] PCP notified via Plan of Care  [] Emergency services notified    SUBJECTIVE:  History of Present Illness:      Pt presents with c/o pain and clicking in R neck which started Dec 22, 2022, no specific injury. Overall has gotten some better since it started but is still present. Pain can come and go but clicking is persistent. Hx HA/migraines, unsure if correlated with neck pain/clicking. Pt is R hand dominant. Pain       Patient reports pain is 0/10 pain at present and 4/10 pain at its worst.  Pain increases with: head movements, sleeping        Decreases with: refraining from moving    Pain description:  more towards dull side, clicking  Paresthesias: none  Pt. reports pain with coughing, sneezing and laughing:   []Yes   []No    [x]NA    Imaging: none     Current Functional Limitations:   [x]Yes   []No  Functional Complaints:  head movements, sleeping          PLOF:   [x]No functional limitations   []Pre-exisiting limitations:  Pt's sleep is affected?    [x]Yes   []No      Social support/Environment:    Family/caregiver support:   [x]Yes   []No    Home Environment:   []1 story   []>2 story   []CARRIE   []No rail   []R handrail    []L handrail    Bathroom Environment:   []Walk in shower   []Tub   []Tub/shower combo     []Grab bars   []Shower seat   []Modified toilet   []Hand held shower head    Equipment:    []Rolling walker   []Standard walker   []Rollator   []Kashif-walker  []Wheelchair   []Quad cane   []Straight cane  []Bedside commode  []Hospital bed  Other:          Relevant Medical History: asthma, ADHD, Tourette's disease, diagnosed with vertigo disorder BPPV 2015, heart palpitations in Nov 2022 and F/U with cardiologist, anxiety, shortness of breath    Co-morbidities/Complexities (which will affect course of rehabilitation):   []None           Arthritic conditions   []Rheumatoid arthritis (M05.9)  []Osteoarthritis (M19.91)   Cardiovascular conditions   []Hypertension (I10)  []Hyperlipidemia (E78.5)  []Angina pectoris (I20)  []Atherosclerosis (I70)   Musculoskeletal conditions   []Disc pathology   []Congenital spine pathologies   []Prior surgical intervention  []Osteoporosis (M81.8)  []Osteopenia (M85.8)   Endocrine conditions   []Hypothyroid (E03.9)  []Hyperthyroid Gastrointestinal conditions   []Constipation (C32.54)   Metabolic conditions   []Morbid obesity (E66.01)  []Diabetes type 1(E10.65) or 2 (E11.65)   []Neuropathy (G60.9)     Pulmonary conditions   [x]Asthma (J45)  []Coughing   []COPD (J44.9)   Psychological Disorders  [x]Anxiety (F41.9)  []Depression (F32.9)   []Other:   [x]Other:   ADHD, Tourette's disease, diagnosed with vertigo disorder BPPV 2015, heart palpitations in Nov 2022 and F/U with cardiologist, shortness of breath        Occupation/School: unemployed    Sports/Hobbies/Recreational Activities: art work, graphic design  - when looking at screen it can contribute to stiffness       OBJECTIVE:     Functional Scale/Score: NDI = 7/45 = 16% impaired    Gait/Steps/Balance       [x]Gait WNL                           []Deviations on a level linoleum surface include:      Balance tests      Hautard's test: []NT []Neg  []Pos with R rot  []Pos with L rot  []Pos with ext      Posture: []WNL  [x]Forward head/shoulder at times  []Forward flexed trunk   []Pronounced CT junction    []Increased thoracic kyphosis   []Scoliosis  []Scapular winging  []Decreased WB on   []R   []L     []Other:       Range of Motion  []All [facility]  [x]Cervical Spine   []Thoracic Spine     [x]All NT except as marked below  ROM Deficits Identified             *Denotes pain AROM               PROM               MMT/Resisted Tests   Flexion WFL, stiffness     Extension 10% - feeling of \"blood rush\", tremors     Sidebending Left WFL - dizzy     Sidebending Right 75% - dizzy     Rotation Left WFL     Rotation Right 74%, clicking, pain         UE Range of Motion/Strength Testing      [x]All ROM NT except as marked below   [x]All strength NT except as marked below    [x]All myotomes NT except as marked below    Range Tested MMT/ Resisted PROM AROM Comments   *denotes pain Left Right Left Right Left Right    Cervical Flexion C1-2          Cervical Sidebend  C3          Shoulder Shrug  C4          Shoulder Flexion 4/5 neck pressure 4/5 neck pressure   [facility] WFL    Shoulder Extension          Shoulder Abduction  C5 4/5 4/5        Shoulder Adduction           Shoulder IR 5/5 5/5        Shoulder ER 4-/5 4-/5        Elbow Flexion  C6          Elbow Extension C7          Pronation          Supination          Wrist Flexion C7          Wrist Extension C6          Radial Deviation          Ulnar Deviation                     Thumb Ext C8          Intrinsics T1              Scapular Strength    []All WFL (5/5) except as marked below   [x]NT   Scapula Left Right   Upper Trapezius     Middle Trapezius     Lower Trapezius     Rhomboid     Serratus Anterior       Latissimus Dorsi         Deep Tendon Reflexes   []All reflexes WNL (2+)    [x]NT   Abnormal Reflex Findings Left Right   Biceps (C5,6)     Brachioradialis (C6)     Triceps (C7)     Abductor Digiti Minimi (C8,T1)     Quadriceps (L3,4)     Achilles (S1,2)     Ankle clonus     Philipp's reflex      Babinski's reflex         Dermatomal Sensation  []All dermatomes WNL for light touch except as marked below   [x]All NT except as marked below  Abnormal Dermatome Findings Left Right   Posterior Head (C2)     Posterior Neck (C3)     Supraclavicular (C4)     Lateral Upper Arm (C5)     Lateral Forearm/1st(C6)     3rd digit (C7)     5th digit (C8)      Medial Arm (T1)        Flexibility   [x]All NT except as marked below  Muscle Abnormal Findings   Pectoralis Minor []WNL   []Decreased R   []Decreased L      Levator Scapula []WNL   []Decreased R   []Decreased L      Scalenes []WNL   []Decreased R   []Decreased L      Upper Trapezius  []WNL   []Decreased R   []Decreased L      Suboccipitals  []WNL   []Decreased R   []Decreased L      Other:  []WNL   []Decreased R   []Decreased L        Special Tests- cervical/thoracic seated   [x]All NT except as marked below  Special Test Abnormal Findings   Union c-spine rules  []Neg   []Pos   []SVETA Michael []Neg   []Pos      Vertebral Artery/Positional Provocative Testing []Neg   []Pos R   []Pos L      Spurling's/Foraminal []Neg   []Pos R   []Pos L      Distraction []Relief noted   []No relief noted      Compression  []Neg   []Pos      Elevated Arm Stress Test (EAST)  []Neg   []Pos R   []Pos L      Thoracic Outlet   Other:  []Neg   []Pos R   []Pos L      Other:  []Neg   []Pos R   []Pos L      Other:  []Neg   []Pos R   []Pos L          Special Tests-supine   [x]All NT except as marked below  Special Test Abnormal Findings   Alar ligament/ C2 spinous kick test []Neg   []Pos R   []Pos L      Vertebral artery/Positional provocative testing []Neg   []Pos R   []Pos L      Modified shear test  []Neg   []Pos R   []Pos L      Median nerve tension test []Neg   []Pos R   []Pos L      Radial nerve tension test []Neg   []Pos R   []Pos L      Ulnar nerve tension test  []Neg   []Pos R   []Pos L      Other []Neg   []Pos R   []Pos L      Other  []Neg   []Pos R   []Pos L        Palpation     Patient reported tenderness with palpation:  [x]Yes   []No   []NA  Location: L cervical paraspinals, mild tightness R UT> L UT     Bandages/Dressings/Incisions: NA                       [x] Patient history, allergies, meds reviewed. Medical chart reviewed. See intake form. Review Of Systems (ROS):  [x]Performed Review of systems (Integumentary, CardioPulmonary, Neurological) by intake and observation. Intake form has been scanned into medical record. Patient has been instructed to contact their primary care physician regarding ROS issues if not already being addressed at this time.         Barriers to/and or personal factors that will affect rehab potential:              []Age  []Sex    []Smoker              []Motivation/Lack of Motivation                        [x]Co-Morbidities              []Cognitive Function, education/learning barriers              []Environmental, home barriers              []profession/work barriers  []past PT/medical experience  []other:  Justification:     Falls Risk Assessment (30 days):   [x] Falls Risk assessed and no intervention required. [] Falls Risk assessed and Patient requires intervention due to being higher risk   TUG score (>12s at risk):     [] Falls education provided, including:         ASSESSMENT: Pt is  33 Y. O  male, presenting with c/o  neck pain and clicking. Assessment reveals deficits in strength, ROM, alignment as well as increased pain. Pt will benefit from cont PT to address these deficits and promote return to highest level of functional independence.     Functional Impairments:     []Noted cervical/thoracic/Glenohumeral joint hypomobility  [x]Decreased cervical/UE functional ROM  []Decreased Deep Cervical Flexor control or ability to hold head up  []Decreased Rotator Cuff/scapular/core strength and neuromuscular control   [x]Decreased Upper Extremity functional strength     []Noted cervical/thoracic/GHJ joint hypermobility  []Abnormal reflexes/sensation/myotomal/dermatomal deficits  []Noted Headache pain aggravated by neck movements with/without dizziness    Functional Activity Limitations (from functional questionnaire and intake)   []Reduced tolerance to prolonged functional positions  [x]Reduced tolerance to changes of positions or transfers between positions  []Reduced ability to maintain good posture and demonstrate good body mechanics with sitting, bending, and lifting  []Reduced ability to perform lifting, reaching, carrying tasks    [x]Reduced ability to sleep   []Reduced tolerance to driving  []Reduced tolerance to computer work  []Reduced ability to concentrate    []Reduced ability to tolerate any impact through UE or spine  []Reduced ability to ambulate prolonged functional periods/distances     Participation Restrictions   []Reduced participation in self care activities   [x]Reduced participation in home management activities   []Reduced participation in work activities   []Reduced participation in social activities. []Reduced participation in sport/recreational activities. Classification:   [x]signs/symptoms consistent with neck pain with mobility deficits    []signs/symptoms consistent with neck pain with movement coordinated impairments     []signs/symptoms consistent with neck pain with radiating pain   []signs/symptoms consistent with neck pain with headaches (cervicogenic)   []signs/symptoms consistent with nerve root involvement including myotome & dermatome dysfunction    []sign/symptoms consistent with facet dysfunction of cervical and thoracic spine     []signs/symptoms consistent with discogenic cervical pain  []signs/symptoms consistent with rib dysfunction  [x]signs/symptoms consistent with postural dysfunction    []signs/symptoms consistent with shoulder pathology   []signs/symptoms consistent with post-surgical status including decreased ROM, strength and function.   []signs/symptoms consistent suggesting central cord compression/UMN syndromes  []signs/symptoms consistent with pathology which may benefit from Dry Needling    []signs/symptoms which may limit the use of advanced manual therapy techniques: (Elevated CV risk profile, recent trauma, intolerance to end range positions, prior TIA, visual issues, UE neurological compromise)       Prognosis/Rehab Potential:      []Excellent   [x]Good    []Fair   []Poor    Tolerance of evaluation/treatment:    []Excellent   [x]Good    []Fair   []Poor     Physical Therapy Evaluation Complexity Justification  [x] A history of present problem with:  [] no personal factors and/or comorbidities that impact the plan of care;  []1-2 personal factors and/or comorbidities that impact the plan of care  [x]3 personal factors and/or comorbidities that impact the plan of care  [x] An examination of body systems using standardized tests and measures addressing any of the following: body structures and functions (impairments), activity limitations, and/or participation restrictions;:  [] a total of 1-2 or more elements   [] a total of 3 or more elements   [x] a total of 4 or more elements   [x] A clinical presentation with:  [x] stable and/or uncomplicated characteristics   [] evolving clinical presentation with changing characteristics  [] unstable and unpredictable characteristics;   [x] Clinical decision making of [x] low, [] moderate, [] high complexity using standardized patient assessment instrument and/or measurable assessment of functional outcome. [x] EVAL (LOW) 20631 (typically 20 minutes face-to-face)  [] EVAL (MOD) 96631 (typically 30 minutes face-to-face)  [] EVAL (HIGH) 72815 (typically 45 minutes face-to-face)  [] RE-EVAL     PLAN:   Frequency/Duration:  1 days per week for 6 Weeks:  Interventions:  [x]  Therapeutic exercise including: strength training, ROM, for cervical spine,scapula, core and Upper extremity, including postural re-education. [x]  NMR activation and proprioception for Deep cervical flexors, periscapular and RC muscles and Core, including postural re-education. [x]  Manual therapy as indicated for C/T spine, ribs, Soft tissue to include: Dry Needling/IASTM, STM, PROM, Gr I-IV mobilizations, manipulation. [x]  Therapeutic Activities for Deep cervical flexors, periscapular and RC muscles and Core, including postural re-education. [x]  Modalities as needed that may include: thermal agents, E-stim, Biofeedback, US, iontophoresis, mechanical traction as indicated  [x]  Patient education on joint protection, postural re-education, activity modification, progression of HEP. HEP: chin tuck, scapular retraction. Access Code [email or web address]    GOALS:  Patient stated goal: \"widen motion range\"  [] Progressing: [] Met: [] Not Met: [] Adjusted    Therapist goals for Patient:   Short Term Goals: To be achieved in: 3 weeks  - Independent in initial HEP per patient tolerance, in order to prevent re-injury.    [] Progressing: [] Met: [] Not Met: [] Adjusted  - Patient will demonstrate good postural awareness consistently t/o session. [] Progressing: [] Met: [] Not Met: [] Adjusted    Long Term Goals: To be achieved in: 6 weeks  - Disability index score of 4% or less for the NDI to assist with reaching prior level of function. 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 [] Progressing: [] Met: [] Not Met: [] Adjusted  - Independent in HEP progression per patient tolerance, in order to prevent re-injury. [] Progressing: [] Met: [] Not Met: [] Adjusted        Electronically signed by:   Scharlene Leventhal, [place], DPT [id number]

## 2023-03-07 NOTE — PROGRESS NOTES
Neck Disability Index Questionnaire    Patient: Barney Ace  : 1990  MRN: [id number]  Date: 3/7/2023  Electronically Signed by: Sheila Jones PT, DPT [id number]     Please Read: This questionnaire is designed to enable us to understand how much your neck pain has affected your ability to manage your everyday activities. Please answer each section by selecting ONE CHOICE that most applies to you. We realize that you may feel that more than one statement may relate to you, but PLEASE JUST SELECT ONE CHOICE THAT MOST CLOSELY DESCRIBES YOUR PROBLEM RIGHT NOW. SECTION 1 - Pain Intensity  []A. I have no pain at the moment  [x]B. The pain is very mild at the moment  []C. The pain is moderate at the moment  []D. The pain is fairly severe at the moment  []E. The pain is very severe at the moment  []F. The pain is the worst imaginable at the moment SECTION 6 - Concentration  []A. I can concentrate fully when I want to with no difficulty  []B. I can concentrate fully when I want to with slight difficulty  [x]C. I have a fair degree of difficulty in concentrating when I want to  []D. I have a lot of difficulty in concentrating when I want to  []E. I have a great deal of difficulty in concentrating when I want to  []F. I cannot concentrate at all   SECTION 2 - Personal Care Valri Katie, etc.)  [x]A. I can look after myself normally without causing extra pain  []B. I can look after myself normally, but it causes me extra pain  []C. It is painful to look after myself and I am slow and careful  []D. I need some help, but manage most of my personal care  []E. I need help every day in most aspects of personal care  []F. I do not get dressed, I wash with difficulty and stay in bed SECTION 7 - Work  [x]A. I can do as much work as I want to  []B. I can only do my usual work, but no more  []C. I can do most of my usual work, but no more  []D. I cannot do my usual work  []E. I can hardly do any work at all  []F.  I cannot do any work at all   [address] [facility] [employer]  [x]A. I can lift heavy weights without extra pain  []B. I can lift heavy weights, but it gives me extra pain  []C. Pain prevents me from lifting heavy weights off the floor but I can manage if they are conveniently positioned, for example, on a table  []D. Pain prevents me from lifting heavy weights, but I can manage light to medium weights if they are conveniently positioned  []E. I can lift very light weights  []F. I cannot lift or carry anything at all SECTION 8 - Driving - NA  []A. I can drive my car without any neck pain  []B. I can drive my car as long as I want with slight pain in my neck  []C. I can drive my car as long as I want with moderate pain in my neck  []D. I cannot drive my car as long as I want because of moderate pain  []E. I can hardly drive at all because of severe pain in my neck  []F. I cannot drive my car at all   SECTION 4 - Reading  [x]A. I can read as much as I want to with no pain in my neck  []B. I can read as much as I want to with slight pain in my neck  []C. I can read as much as I want to with moderate pain in my neck   []D. I cannot read as much as I want because of moderate pain in my neck  []E. I cannot read as much as I want because of severe pain in my neck  []F. I cannot read at all SECTION 9 - Sleeping  []A. I have no trouble sleeping  [x]B. My sleep is slightly disturbed (< 1 hour sleepless)  []C. My sleep is mildly disturbed (1-2 hours sleepless)  []D. My sleep is moderately disturbed (2-3 hours sleepless)  []E. My sleep is greatly disturbed (3-5 hours sleepless)  []F. My sleep is completely disturbed (5-7 hours sleepless)   SECTION 5 - Headaches  []A. I have no headaches at all  [x]B. I have slight headaches which come infrequently  []C. I have moderate headaches which come infrequently  []D. I have moderate headaches which come frequently  []E. I have severe headaches which come frequently  []F.  I have headaches almost all the time SECTION 10 - Recreation  []A. I am able to engage in all of my recreational activities with no neck pain at all  []B. I am able to engage in all of my recreational activities with some pain in my neck  [x]C. I am able to engage in most, but not all of my recreational activities because of pain in my neck  []D. I am able to engage in a few of my recreational activities because of my neck  []E. I can hardly do any recreational activities because of pain in my neck  []F. I cannot do any recreational activities at all     COMMENTS:     Patient Score: 7/45    Scoring Method for the Neck Disability Index      1. Each of the 10 sections is scored separately (0 to 5 points each) then added up (max. Total = 50). EXAMPLE:  Section 1. Pain Intensity  Item Score Item Description Point Value   A I have no pain at the moment 0   B The pain is very mild at the moment 1   C The pain is moderate at the moment 2   D The pain is fairly severe at the moment 3   E The pain is very severe at the moment 4   F The pain is the worst imaginable 5     2. If all 10 sections are completed, simply double the patient's score. 3. If a section is omitted, divide the patient's total score by the number of sections completed times 5. FORMULA: [(Patient's score) / (# Sections Completed X 5)] X 100 = ____% Disability    EXAMPLE:  If number of sections completed = 9  If patient's score = 22  The equation = [22 / (9 X 5)] X 100 = 48.9% Disability    4. Interpretation of disability scores:    SCORE   0-20% = Minimal disability   20-40% = Moderate disability   40-60% = Severe disability   60-80% = Crippled   % = Bed-bound or exaggerating     Debbie Nunez The Neck Disability Index: A study of reliability and validity.  J Manipulative Physiol Ther 1991;14:409-415    G-Code [place]:  NDI Total Score Disability Index CMS Modifier   0 0% []CH   1-19 1-19% []CI   10-19 20-39% []CJ   20-29 40-59% []CK   30-39 60-79% []CL   40-49 80-99% []CM   50 100% []CN

## 2023-03-20 ENCOUNTER — HOSPITAL ENCOUNTER (OUTPATIENT)
Dept: PHYSICAL THERAPY | Age: 33
Setting detail: THERAPIES SERIES
Discharge: HOME | End: 2023-03-20
Payer: COMMERCIAL

## 2023-03-20 PROCEDURE — 97110 THERAPEUTIC EXERCISES: CPT

## 2023-03-20 NOTE — FLOWSHEET NOTE
Met: [] Adjusted  -Patient will have decreased pain 1/10 at worst with rare clicking to allow for sleep with rare complaints. [] Progressing: [] Met: [] Not Met: [] Adjusted  - Independent in HEP progression per patient tolerance, in order to prevent re-injury. [] Progressing: [] Met: [] Not Met: [] Adjusted     ASSESSMENT:  Pt with good tolerance to new exercises today without increased pain reported but could tell he worked muscles. Pt will benefit from additional therapy to address deficits to return to PLOF. Treatment/Activity Tolerance:  [x] Patient tolerated treatment well [] Patient limited by fatique  [] Patient limited by pain  [] Patient limited by other medical complications  [] Other:     Overall Progression Towards Functional goals/ Treatment Progress Update:  [] Patient is progressing as expected towards functional goals listed. [] Progression is slowed due to complexities/Impairments listed. [] Progression has been slowed due to co-morbidities. [x] Plan just implemented, too soon to assess goals progression <30days   [] Goals require adjustment due to lack of progress  [] Patient is not progressing as expected and requires additional follow up with physician  [] Other    Prognosis for POC: [x] Good [] Fair  [] Poor    Patient requires continued skilled intervention: [x] Yes  [] No        PLAN:   [x] Continue per plan of care [] Alter current plan (see comments)  [] Plan of care initiated [] Hold pending MD visit [] Discharge    Electronically signed by: Ramírez Ruiz, PT, DPT [id number]    Note: If patient does not return for scheduled/recommended follow up visits, this note will serve as a discharge from care along with the most recent update on progress.

## 2023-03-29 ENCOUNTER — HOSPITAL ENCOUNTER (OUTPATIENT)
Dept: PHYSICAL THERAPY | Age: 33
Setting detail: THERAPIES SERIES
Discharge: HOME | End: 2023-03-29
Payer: COMMERCIAL

## 2023-03-29 PROCEDURE — 97140 MANUAL THERAPY 1/> REGIONS: CPT

## 2023-03-29 PROCEDURE — 97110 THERAPEUTIC EXERCISES: CPT

## 2023-03-29 NOTE — FLOWSHEET NOTE
Reviewed/Progressed HEP activities related to improving balance, coordination, kinesthetic sense, posture, motor skill, proprioception of scapular, scapulothoracic and UE control with self care, reaching, carrying, lifting, house/yardwork, driving/computer work. Manual Treatments:  PROM / STM / Oscillations-Mobs:  G-I, II, III, IV (PA's, Inf., Post.)  [x] (47151) Provided manual therapy to mobilize soft tissue/joints of cervical/CT, scapular GHJ and UE for the purpose of modulating pain, promoting relaxation,  increasing ROM, reducing/eliminating soft tissue swelling/inflammation/restriction, improving soft tissue extensibility and allowing for proper ROM for normal function with self care, reaching, carrying, lifting, house/yardwork, driving/computer work. -PROM with hold at end in cervical flexion/SB and passive stretch to levator/UT, SOR, gentle STM of levators/UT,scalenes. Noted stiff musculature, but no knots/ropes    Modalities:    [] Electric Stimulation:   [] Ultrasound:   [] Other:       Charges:  Timed Code Treatment Minutes: TE: 30, Man: 15   Total Treatment Minutes: 45      [] EVAL (LOW) 55683 (typically 20 minutes face-to-face)  [] EVAL (MOD) 73026 (typically 30 minutes face-to-face)  [] EVAL (HIGH) 78800 (typically 45 minutes face-to-face)  [] RE-EVAL     [x] MC(87216) x 2      [] NMR (56528) x       [x] Manual (35329) x   1    [] TA (37811) x         [] ES(attended) (83026)   [] ES (un) (85782)   [] DRY NEEDLE 1 OR 2 MUSCLES  [] DRY NEEDLE 3+ MUSCLES  [] Mech Traction (90032)  [] Ultrasound (61493)  [] Other:    GOALS:  Patient stated goal: \"widen motion range\"  [] Progressing: [] Met: [] Not Met: [] Adjusted    Therapist goals for Patient:   Short Term Goals: To be achieved in: 3 weeks  - Independent in initial HEP per patient tolerance, in order to prevent re-injury.    [] Progressing: [] Met: [] Not Met: [] Adjusted  - Patient will demonstrate good postural awareness consistently t/o

## 2023-04-05 ENCOUNTER — HOSPITAL ENCOUNTER (OUTPATIENT)
Dept: PHYSICAL THERAPY | Age: 33
Setting detail: THERAPIES SERIES
Discharge: HOME | End: 2023-04-05

## 2023-04-05 NOTE — CARE COORDINATION
[facility] Outpatient Therapy  [address] [place] [address], [address], [address]  Phone: [phone]   Fax: [phone]    Physical Therapy Missed Visit Note     Date:  2023    Patient Name:  Ciera Martinez      :  1990    MRN: [id number]      Cancelled visits to date: 1  2023  No-shows to date: 0    For today's appointment patient:  [x]  Cancelled  []  Rescheduled appointment  []  No-show     Reason given by patient:  []  Patient ill  []  Conflicting appointment  []  No transportation    []  Conflict with work  []  No reason given  [x]  Other:  bad weather   Comments:      Electronically signed by:   Christiano Rodriguez, [address] [facility], DPT [id number]

## 2023-05-03 ENCOUNTER — HOSPITAL ENCOUNTER (OUTPATIENT)
Dept: PHYSICAL THERAPY | Age: 33
Setting detail: THERAPIES SERIES
Discharge: HOME | End: 2023-05-03
Payer: COMMERCIAL

## 2023-05-03 PROCEDURE — 97140 MANUAL THERAPY 1/> REGIONS: CPT

## 2023-05-03 PROCEDURE — 97110 THERAPEUTIC EXERCISES: CPT

## 2023-05-03 NOTE — FLOWSHEET NOTE
[facility] Outpatient Therapy  [address] [place] [address] [address], [address]  Phone: [phone]   Fax: [phone]    Physical Therapy Treatment Note/ Progress Report:     Date:  2023     Patient Name:  Olin Canavan    :  1990  MRN: [id number]  Medical Diagnosis:  Neck pain [M54.2]  Treatment Diagnosis:  Treatment Diagnosis: neck pain C54.6  Insurance/Certification information:  PT Insurance Information: [employer], 30 visits  Physician Information:  Puma Louis MD   Plan of care signed:    [x] Yes  [] No    Date of Patient follow up with Physician: ?     Progress Report: []  Yes  [x]  No   If Yes, Date Range for reporting period:  Beginnin2023  Ending:  NA    Progress report due (10 Rx/or 30 days whichever is less):     Recertification due (POC duration/ or 90 days whichever is less):  2023    Visit # Insurance Allowable Auth Needed    30 []Yes   [x]No     RESTRICTIONS/PRECAUTIONS: asthma, ADHD, Tourette's disease, diagnosed with vertigo disorder BPPV , heart palpitations in 2022 and F/U with cardiologist, anxiety, shortness of breath  Latex Allergy:  [x]NO      []YES  Preferred Language for Healthcare:   [x]English       []other:    Functional Scale: NDI   Score: 4/45 = 8.89% imparied  Date assessed: 23    Pain level:  0/10, no pain today and has not had neck pain for a few weeks. States he has minor pain when he has pressure going through the base of his skull    SUBJECTIVE:  Pt states his neck has been ok, and has been on and off. He states it has been cracking and crunchy. Pt reports of pain not being much of a factor recently, states he feels \"pressure\" in back of his skull during some stretches. Pt states the posterior aspect of his neck and skull become tender when he puts pressure through that part. OBJECTIVE:            Exercises/Interventions: Exercises in bold performed in department today.   Items not

## 2023-05-25 ENCOUNTER — HOSPITAL ENCOUNTER (OUTPATIENT)
Dept: PHYSICAL THERAPY | Age: 33
Setting detail: THERAPIES SERIES
Discharge: HOME | End: 2023-05-25
Payer: COMMERCIAL

## 2023-05-25 PROCEDURE — 97140 MANUAL THERAPY 1/> REGIONS: CPT

## 2023-05-25 PROCEDURE — 97110 THERAPEUTIC EXERCISES: CPT

## 2023-05-25 NOTE — THERAPY RECERTIFICATION
[facility] Outpatient Therapy  [address] [place] [address],  [address], [address]  Phone: [phone]   Fax: [phone]    Physical Therapy Treatment Note/ Progress Report:     Date:  2023     Patient Name:  Matilde Mccollum    :  1990  MRN: [id number]  Medical Diagnosis:  Neck pain [M54.2]  Treatment Diagnosis:  Treatment Diagnosis: neck pain H12.4  Insurance/Certification information:  PT Insurance Information: [employer], 30 visits  Physician Information:  Shanon Dorsey MD   Plan of care signed:    [x] Yes  [] No    Date of Patient follow up with Physician: ?     Progress Report: [x]  Yes  []  No   If Yes, Date Range for reporting period:  Beginnin2023  Endin2023    Progress report due (10 Rx/or 30 days whichever is less):     Recertification due (POC duration/ or 90 days whichever is less):  2023    Visit # Insurance Allowable Auth Needed    +  30 []Yes   [x]No     RESTRICTIONS/PRECAUTIONS: asthma, ADHD, Tourette's disease, diagnosed with vertigo disorder BPPV , heart palpitations in 2022 and F/U with cardiologist, anxiety, shortness of breath  Latex Allergy:  [x]NO      []YES  Preferred Language for Healthcare:   [x]English       []other:    Functional Scale: NDI   Score: 1/45 = 2% imparied  Date assessed: 23    Pain level:  0/10    SUBJECTIVE:  Pt reports that his neck has been feeling better - hasn't really had any pain - has gradually gotten better over the last couple of weeks. Still with clicking when looking to R. Still feels tenderness to base of skull if pressure is there. Has been able to try cervical ext stretch off edge of bed with good tolerance.          OBJECTIVE:     MMT shoulder flex: R:  5/5  L:  5/5  ABD: R:  5/5 L:  5/5  ER: R:  5/5 L: 5/5     ROM Deficits Identified             *Denotes pain AROM                 Flexion WFL   Extension 75% (minor dizziness)   Sidebending Left [facility]

## 2023-06-06 ENCOUNTER — E-VISIT (OUTPATIENT)
Dept: PRIMARY CARE CLINIC | Age: 33
End: 2023-06-06
Payer: COMMERCIAL

## 2023-06-06 DIAGNOSIS — B36.9 FUNGAL DERMATITIS: Primary | ICD-10-CM

## 2023-06-06 PROCEDURE — 99422 OL DIG E/M SVC 11-20 MIN: CPT | Performed by: NURSE PRACTITIONER

## 2023-06-06 RX ORDER — CLOTRIMAZOLE AND BETAMETHASONE DIPROPIONATE 10; .64 MG/G; MG/G
CREAM TOPICAL
Qty: 45 G | Refills: 0 | Status: SHIPPED | OUTPATIENT
Start: 2023-06-06

## 2023-06-06 NOTE — PROGRESS NOTES
Reviewed questionnaire and photos    Reviewed meds/allergies    Dx Fungal dermatitis    Plan Rx given for lotrisone, follow up with PCP if no improvement    Time spent on visit 12 min

## 2023-06-29 ENCOUNTER — HOSPITAL ENCOUNTER (OUTPATIENT)
Dept: PHYSICAL THERAPY | Age: 33
Setting detail: THERAPIES SERIES
Discharge: HOME | End: 2023-06-29
Payer: COMMERCIAL

## 2023-06-29 PROCEDURE — 97110 THERAPEUTIC EXERCISES: CPT

## 2025-01-21 ENCOUNTER — OFFICE VISIT (OUTPATIENT)
Dept: FAMILY MEDICINE CLINIC | Age: 35
End: 2025-01-21
Payer: COMMERCIAL

## 2025-01-21 VITALS
BODY MASS INDEX: 17.04 KG/M2 | DIASTOLIC BLOOD PRESSURE: 82 MMHG | OXYGEN SATURATION: 98 % | WEIGHT: 106 LBS | HEIGHT: 66 IN | TEMPERATURE: 98.5 F | SYSTOLIC BLOOD PRESSURE: 110 MMHG | HEART RATE: 84 BPM

## 2025-01-21 DIAGNOSIS — R35.0 URINARY FREQUENCY: Primary | ICD-10-CM

## 2025-01-21 PROCEDURE — 99214 OFFICE O/P EST MOD 30 MIN: CPT | Performed by: NURSE PRACTITIONER

## 2025-01-21 PROCEDURE — G8419 CALC BMI OUT NRM PARAM NOF/U: HCPCS | Performed by: NURSE PRACTITIONER

## 2025-01-21 PROCEDURE — G8427 DOCREV CUR MEDS BY ELIG CLIN: HCPCS | Performed by: NURSE PRACTITIONER

## 2025-01-21 PROCEDURE — 1036F TOBACCO NON-USER: CPT | Performed by: NURSE PRACTITIONER

## 2025-01-21 SDOH — ECONOMIC STABILITY: FOOD INSECURITY: WITHIN THE PAST 12 MONTHS, THE FOOD YOU BOUGHT JUST DIDN'T LAST AND YOU DIDN'T HAVE MONEY TO GET MORE.: PATIENT DECLINED

## 2025-01-21 SDOH — ECONOMIC STABILITY: FOOD INSECURITY: WITHIN THE PAST 12 MONTHS, YOU WORRIED THAT YOUR FOOD WOULD RUN OUT BEFORE YOU GOT MONEY TO BUY MORE.: PATIENT DECLINED

## 2025-01-21 ASSESSMENT — ENCOUNTER SYMPTOMS
BACK PAIN: 0
ABDOMINAL PAIN: 0
COUGH: 0
COLOR CHANGE: 0
SINUS PRESSURE: 0
CONSTIPATION: 0
DIARRHEA: 0
WHEEZING: 0
SINUS PAIN: 0
SHORTNESS OF BREATH: 0

## 2025-01-21 ASSESSMENT — PATIENT HEALTH QUESTIONNAIRE - PHQ9
SUM OF ALL RESPONSES TO PHQ QUESTIONS 1-9: 0
SUM OF ALL RESPONSES TO PHQ QUESTIONS 1-9: 0
1. LITTLE INTEREST OR PLEASURE IN DOING THINGS: NOT AT ALL
SUM OF ALL RESPONSES TO PHQ QUESTIONS 1-9: 0
SUM OF ALL RESPONSES TO PHQ9 QUESTIONS 1 & 2: 0
SUM OF ALL RESPONSES TO PHQ QUESTIONS 1-9: 0
DEPRESSION UNABLE TO ASSESS: FUNCTIONAL CAPACITY MOTIVATION LIMITS ACCURACY
2. FEELING DOWN, DEPRESSED OR HOPELESS: NOT AT ALL

## 2025-01-21 NOTE — PROGRESS NOTES
Estevan Baron (:  1990) is a 34 y.o. male,Established patient, here for evaluation of the following chief complaint(s):  Urinary Urgency (Present for ~1 year)     Assessment & Plan  Urinary frequency   Chronic, not at goal (unstable), has been present since .  Has not had this evaluated.  Will obtain urinalysis.  Discussed avoiding sugary drinks like soda.  Avoid caffeine.    Orders:    Urinalysis with Reflex to Culture      No follow-ups on file.         Subjective   HPI  Here for urinary frequency since .  No dysuria. No difficulty emptying bladder. Some urgency at times.   No hematuria. Sometimes has dark urine. No flank pain.   No fever, body aches, chills. Denies pain including scrotal or rectal pain. No penile dischage. No concerns for STI.   He does note that he notices this more after drinking soda.    Allergies   Allergen Reactions    Ciprofloxacin      Joint pain       Current Outpatient Medications   Medication Sig Dispense Refill    clotrimazole-betamethasone (LOTRISONE) 1-0.05 % cream Apply externally bid x 3 days then daily x 4 days. 45 g 0    Ascorbic Acid (VITAMIN C) 250 MG tablet Take 1 tablet by mouth daily      Cholecalciferol (VITAMIN D3) 50 MCG (2000 UT) CAPS Take by mouth      b complex vitamins capsule Take 1 capsule by mouth daily      Fiber Adult Gummies 2 g CHEW Take by mouth      meloxicam (MOBIC) 7.5 MG tablet Take 1 tablet by mouth daily for 7 days 7 tablet 0     No current facility-administered medications for this visit.       Review of Systems   Constitutional:  Negative for chills, fatigue and fever.   HENT:  Negative for congestion, sinus pressure and sinus pain.    Respiratory:  Negative for cough, shortness of breath and wheezing.    Cardiovascular:  Negative for chest pain and palpitations.   Gastrointestinal:  Negative for abdominal pain, constipation and diarrhea.   Genitourinary:  Positive for frequency and urgency. Negative for decreased urine volume,

## 2025-01-23 DIAGNOSIS — R31.29 OTHER MICROSCOPIC HEMATURIA: Primary | ICD-10-CM

## 2025-01-23 LAB
BACTERIA URNS QL MICRO: NORMAL /HPF
BILIRUB UR QL STRIP.AUTO: NEGATIVE
CLARITY UR: CLEAR
COLOR UR: YELLOW
EPI CELLS #/AREA URNS AUTO: 0 /HPF (ref 0–5)
GLUCOSE UR STRIP.AUTO-MCNC: NEGATIVE MG/DL
HGB UR QL STRIP.AUTO: ABNORMAL
HYALINE CASTS #/AREA URNS AUTO: 0 /LPF (ref 0–8)
KETONES UR STRIP.AUTO-MCNC: ABNORMAL MG/DL
LEUKOCYTE ESTERASE UR QL STRIP.AUTO: NEGATIVE
NITRITE UR QL STRIP.AUTO: NEGATIVE
PH UR STRIP.AUTO: 6.5 [PH] (ref 5–8)
PROT UR STRIP.AUTO-MCNC: NEGATIVE MG/DL
RBC CLUMPS #/AREA URNS AUTO: 1 /HPF (ref 0–4)
SP GR UR STRIP.AUTO: 1.02 (ref 1–1.03)
UA COMPLETE W REFLEX CULTURE PNL UR: ABNORMAL
UA DIPSTICK W REFLEX MICRO PNL UR: YES
URN SPEC COLLECT METH UR: ABNORMAL
UROBILINOGEN UR STRIP-ACNC: 0.2 E.U./DL
WBC #/AREA URNS AUTO: 0 /HPF (ref 0–5)

## 2025-02-13 ENCOUNTER — HOSPITAL ENCOUNTER (OUTPATIENT)
Dept: CT IMAGING | Age: 35
Discharge: HOME | End: 2025-02-13
Payer: COMMERCIAL

## 2025-02-13 DIAGNOSIS — R31.29 OTHER MICROSCOPIC HEMATURIA: ICD-10-CM

## 2025-02-13 PROCEDURE — 74176 CT ABD & PELVIS W/O CONTRAST: CPT

## 2025-04-30 ENCOUNTER — OFFICE VISIT (OUTPATIENT)
Dept: FAMILY MEDICINE CLINIC | Age: 35
End: 2025-04-30
Payer: COMMERCIAL

## 2025-04-30 VITALS
WEIGHT: 109 LBS | OXYGEN SATURATION: 99 % | BODY MASS INDEX: 17.52 KG/M2 | HEIGHT: 66 IN | DIASTOLIC BLOOD PRESSURE: 82 MMHG | TEMPERATURE: 98.5 F | SYSTOLIC BLOOD PRESSURE: 110 MMHG | HEART RATE: 79 BPM

## 2025-04-30 DIAGNOSIS — Z00.00 ROUTINE GENERAL MEDICAL EXAMINATION AT A HEALTH CARE FACILITY: ICD-10-CM

## 2025-04-30 DIAGNOSIS — N39.41 URGENCY INCONTINENCE: Primary | ICD-10-CM

## 2025-04-30 DIAGNOSIS — R35.0 URINARY FREQUENCY: ICD-10-CM

## 2025-04-30 PROCEDURE — 1036F TOBACCO NON-USER: CPT | Performed by: NURSE PRACTITIONER

## 2025-04-30 PROCEDURE — G8419 CALC BMI OUT NRM PARAM NOF/U: HCPCS | Performed by: NURSE PRACTITIONER

## 2025-04-30 PROCEDURE — G2211 COMPLEX E/M VISIT ADD ON: HCPCS | Performed by: NURSE PRACTITIONER

## 2025-04-30 PROCEDURE — 99214 OFFICE O/P EST MOD 30 MIN: CPT | Performed by: NURSE PRACTITIONER

## 2025-04-30 PROCEDURE — G8427 DOCREV CUR MEDS BY ELIG CLIN: HCPCS | Performed by: NURSE PRACTITIONER

## 2025-04-30 ASSESSMENT — ENCOUNTER SYMPTOMS
DIARRHEA: 0
WHEEZING: 0
COUGH: 0
COLOR CHANGE: 0
SHORTNESS OF BREATH: 0
BACK PAIN: 0
SINUS PRESSURE: 0
SINUS PAIN: 0
CONSTIPATION: 0
ABDOMINAL PAIN: 0

## 2025-04-30 NOTE — PROGRESS NOTES
Estevan Baron (:  1990) is a 34 y.o. male,Established patient, here for evaluation of the following chief complaint(s):  Referral - General (Urology referral)    Assessment & Plan  1. Urinary frequency.  - Reports persistent urinary frequency and urgency without burning.  - Physical exam findings and test results indicate no abnormalities from prior CT scan.  - Blood work will be conducted to assess blood sugar levels, thyroid function, and kidney function.  - A repeat urine test will be ordered, and a referral to the [facility] will be made for further evaluation.    2. Microscopic hematuria.  - History of microscopic hematuria identified coincidentally.  - Previous CT scan showed no abnormalities.  - A repeat urine test will be ordered to determine if the hematuria is persistent.  - Referral to the [facility]rology [facility] will be made for further evaluation.        No follow-ups on file.    SUBJECTIVE/OBJECTIVE:    History of Present Illness  The patient presents for evaluation of urinary frequency.    Persistent urinary urgency is reported, with no associated dysuria. The urgency is particularly pronounced after fluid intake, often manifesting within an hour to an hour and a half post-consumption. Abdominal pressure or bladder compression exacerbates the urgency. He has abstained from caffeine for several months and has reduced his intake of carbonated beverages, primarily consuming water, approximately 2 to 3 bottles daily.    He was here about 3 months ago for the same.  Underwent urinalysis which showed microscopic hematuria.  Underwent CT which was unremarkable.  Has not had repeat urinalysis since then.    A referral to a urologist is sought for further evaluation of urinary symptoms and microscopic hematuria, which was incidentally discovered during the last visit. A subsequent CT scan yielded normal results. No follow-up urinalysis has been conducted since the initial finding.      Current Outpatient

## 2025-05-01 ENCOUNTER — RESULTS FOLLOW-UP (OUTPATIENT)
Dept: FAMILY MEDICINE CLINIC | Age: 35
End: 2025-05-01

## 2025-05-01 LAB
ALBUMIN SERPL-MCNC: 4.8 G/DL (ref 3.4–5)
ALBUMIN/GLOB SERPL: 2.1 {RATIO} (ref 1.1–2.2)
ALP SERPL-CCNC: 76 U/L (ref 40–129)
ALT SERPL-CCNC: 22 U/L (ref 10–40)
ANION GAP SERPL CALCULATED.3IONS-SCNC: 10 MMOL/L (ref 3–16)
AST SERPL-CCNC: 22 U/L (ref 15–37)
BACTERIA URNS QL MICRO: ABNORMAL /HPF
BASOPHILS # BLD: 0 K/UL (ref 0–0.2)
BASOPHILS NFR BLD: 0.9 %
BILIRUB SERPL-MCNC: 0.5 MG/DL (ref 0–1)
BILIRUB UR QL STRIP.AUTO: NEGATIVE
BUN SERPL-MCNC: 9 MG/DL (ref 7–20)
CALCIUM SERPL-MCNC: 10.1 MG/DL (ref 8.3–10.6)
CHLORIDE SERPL-SCNC: 104 MMOL/L (ref 99–110)
CLARITY UR: CLEAR
CO2 SERPL-SCNC: 29 MMOL/L (ref 21–32)
COLOR UR: YELLOW
CREAT SERPL-MCNC: 0.8 MG/DL (ref 0.9–1.3)
DEPRECATED RDW RBC AUTO: 12.8 % (ref 12.4–15.4)
EOSINOPHIL # BLD: 0.1 K/UL (ref 0–0.6)
EOSINOPHIL NFR BLD: 2.1 %
EPI CELLS #/AREA URNS AUTO: 0 /HPF (ref 0–5)
EST. AVERAGE GLUCOSE BLD GHB EST-MCNC: 88.2 MG/DL
GFR SERPLBLD CREATININE-BSD FMLA CKD-EPI: >90 ML/MIN/{1.73_M2}
GLUCOSE SERPL-MCNC: 89 MG/DL (ref 70–99)
GLUCOSE UR STRIP.AUTO-MCNC: NEGATIVE MG/DL
HBA1C MFR BLD: 4.7 %
HCT VFR BLD AUTO: 45.5 % (ref 40.5–52.5)
HGB BLD-MCNC: 15.7 G/DL (ref 13.5–17.5)
HGB UR QL STRIP.AUTO: NEGATIVE
HYALINE CASTS #/AREA URNS AUTO: 0 /LPF (ref 0–8)
KETONES UR STRIP.AUTO-MCNC: ABNORMAL MG/DL
LEUKOCYTE ESTERASE UR QL STRIP.AUTO: NEGATIVE
LYMPHOCYTES # BLD: 1.2 K/UL (ref 1–5.1)
LYMPHOCYTES NFR BLD: 31.4 %
MCH RBC QN AUTO: 31.2 PG (ref 26–34)
MCHC RBC AUTO-ENTMCNC: 34.5 G/DL (ref 31–36)
MCV RBC AUTO: 90.5 FL (ref 80–100)
MONOCYTES # BLD: 0.4 K/UL (ref 0–1.3)
MONOCYTES NFR BLD: 10.6 %
NEUTROPHILS # BLD: 2.1 K/UL (ref 1.7–7.7)
NEUTROPHILS NFR BLD: 55 %
NITRITE UR QL STRIP.AUTO: NEGATIVE
PH UR STRIP.AUTO: 7 [PH] (ref 5–8)
PLATELET # BLD AUTO: 188 K/UL (ref 135–450)
PMV BLD AUTO: 10 FL (ref 5–10.5)
POTASSIUM SERPL-SCNC: 4 MMOL/L (ref 3.5–5.1)
PROT SERPL-MCNC: 7.1 G/DL (ref 6.4–8.2)
PROT UR STRIP.AUTO-MCNC: ABNORMAL MG/DL
RBC # BLD AUTO: 5.03 M/UL (ref 4.2–5.9)
RBC CLUMPS #/AREA URNS AUTO: 6 /HPF (ref 0–4)
SODIUM SERPL-SCNC: 143 MMOL/L (ref 136–145)
SP GR UR STRIP.AUTO: 1.02 (ref 1–1.03)
TSH SERPL DL<=0.005 MIU/L-ACNC: 0.55 UIU/ML (ref 0.27–4.2)
UA DIPSTICK W REFLEX MICRO PNL UR: YES
URN SPEC COLLECT METH UR: ABNORMAL
UROBILINOGEN UR STRIP-ACNC: 1 E.U./DL
WBC # BLD AUTO: 3.8 K/UL (ref 4–11)
WBC #/AREA URNS AUTO: 0 /HPF (ref 0–5)

## 2025-05-02 LAB
PROSTATE SPECIFIC ANTIGEN: 0.5 NG/ML (ref 0–4)
PSA FREE MFR SERPL: 40 %
PSA FREE SERPL-MCNC: 0.2 UG/L